# Patient Record
Sex: FEMALE | Race: BLACK OR AFRICAN AMERICAN | NOT HISPANIC OR LATINO | Employment: FULL TIME | URBAN - METROPOLITAN AREA
[De-identification: names, ages, dates, MRNs, and addresses within clinical notes are randomized per-mention and may not be internally consistent; named-entity substitution may affect disease eponyms.]

---

## 2023-02-03 PROBLEM — D72.819 LEUKOPENIA: Status: ACTIVE | Noted: 2023-02-03

## 2023-02-03 PROBLEM — H35.54 RETINAL PIGMENT EPITHELIAL HYPERTROPHY: Status: ACTIVE | Noted: 2023-02-03

## 2023-02-03 PROBLEM — D64.9 ANEMIA: Status: ACTIVE | Noted: 2023-02-03

## 2023-02-03 PROBLEM — E11.9 TYPE 2 DIABETES MELLITUS (MULTI): Status: ACTIVE | Noted: 2023-02-03

## 2023-02-03 PROBLEM — E03.9 HYPOTHYROIDISM: Status: ACTIVE | Noted: 2023-02-03

## 2023-02-03 PROBLEM — H00.019 STY, EXTERNAL: Status: ACTIVE | Noted: 2023-02-03

## 2023-02-03 PROBLEM — H52.4 PRESBYOPIA OF BOTH EYES: Status: ACTIVE | Noted: 2023-02-03

## 2023-02-03 PROBLEM — R29.818 SUSPECTED SLEEP APNEA: Status: ACTIVE | Noted: 2023-02-03

## 2023-02-03 PROBLEM — E78.9 LIPID DISORDER: Status: ACTIVE | Noted: 2023-02-03

## 2023-02-03 PROBLEM — H52.221 HYPEROPIA OF RIGHT EYE WITH REGULAR ASTIGMATISM: Status: ACTIVE | Noted: 2023-02-03

## 2023-02-03 PROBLEM — M79.10 MYALGIA: Status: ACTIVE | Noted: 2023-02-03

## 2023-02-03 PROBLEM — C02.9 TONGUE CANCER (MULTI): Status: ACTIVE | Noted: 2023-02-03

## 2023-02-03 PROBLEM — M54.2 NECK PAIN: Status: ACTIVE | Noted: 2023-02-03

## 2023-02-03 PROBLEM — Z85.810 HISTORY OF TONGUE CANCER: Status: ACTIVE | Noted: 2023-02-03

## 2023-02-03 PROBLEM — E55.9 VITAMIN D DEFICIENCY: Status: ACTIVE | Noted: 2023-02-03

## 2023-02-03 PROBLEM — D72.829 LEUKOCYTOSIS: Status: ACTIVE | Noted: 2023-02-03

## 2023-02-03 PROBLEM — F43.9 STRESS: Status: ACTIVE | Noted: 2023-02-03

## 2023-02-03 PROBLEM — E66.9 OBESITY, CLASS II, BMI 35-39.9: Status: ACTIVE | Noted: 2023-02-03

## 2023-02-03 PROBLEM — E66.812 OBESITY, CLASS II, BMI 35-39.9: Status: ACTIVE | Noted: 2023-02-03

## 2023-02-03 PROBLEM — R06.83 SNORING: Status: ACTIVE | Noted: 2023-02-03

## 2023-02-03 PROBLEM — C77.3: Status: ACTIVE | Noted: 2023-02-03

## 2023-02-03 PROBLEM — J30.9 ALLERGIC RHINITIS: Status: ACTIVE | Noted: 2023-02-03

## 2023-02-03 PROBLEM — E88.810 DYSMETABOLIC SYNDROME: Status: ACTIVE | Noted: 2023-02-03

## 2023-02-03 PROBLEM — G89.18 ACUTE POST-OPERATIVE PAIN: Status: ACTIVE | Noted: 2023-02-03

## 2023-02-03 PROBLEM — G47.30 SLEEP APNEA: Status: ACTIVE | Noted: 2023-02-03

## 2023-02-03 PROBLEM — K14.8 TONGUE LESION: Status: ACTIVE | Noted: 2023-02-03

## 2023-02-03 PROBLEM — R00.0 TACHYCARDIA: Status: ACTIVE | Noted: 2023-02-03

## 2023-02-03 PROBLEM — H52.01 HYPEROPIA OF RIGHT EYE WITH REGULAR ASTIGMATISM: Status: ACTIVE | Noted: 2023-02-03

## 2023-02-03 PROBLEM — H25.013 CORTICAL AGE-RELATED CATARACT OF BOTH EYES: Status: ACTIVE | Noted: 2023-02-03

## 2023-02-03 PROBLEM — M25.569 KNEE PAIN: Status: ACTIVE | Noted: 2023-02-03

## 2023-02-03 PROBLEM — H52.13 AXIAL MYOPIA OF BOTH EYES: Status: ACTIVE | Noted: 2023-02-03

## 2023-02-03 PROBLEM — G47.00 INSOMNIA: Status: ACTIVE | Noted: 2023-02-03

## 2023-02-03 PROBLEM — R50.9 FEVER AND CHILLS: Status: ACTIVE | Noted: 2023-02-03

## 2023-02-03 PROBLEM — M17.11 ARTHRITIS OF KNEE, RIGHT: Status: ACTIVE | Noted: 2023-02-03

## 2023-02-03 PROBLEM — R73.9 HIGH BLOOD SUGAR: Status: ACTIVE | Noted: 2023-02-03

## 2023-02-03 RX ORDER — LEVOTHYROXINE SODIUM 50 UG/1
1 TABLET ORAL DAILY
COMMUNITY
Start: 2022-05-20 | End: 2023-06-13

## 2023-02-03 RX ORDER — FLUTICASONE PROPIONATE 50 MCG
1-2 SPRAY, SUSPENSION (ML) NASAL DAILY
COMMUNITY
Start: 2019-05-09

## 2023-02-03 RX ORDER — ASPIRIN 325 MG
TABLET, DELAYED RELEASE (ENTERIC COATED) ORAL
COMMUNITY

## 2023-02-03 RX ORDER — LORATADINE 10 MG/1
1 TABLET ORAL DAILY
COMMUNITY
Start: 2019-05-09

## 2023-03-16 ENCOUNTER — OFFICE VISIT (OUTPATIENT)
Dept: PRIMARY CARE | Facility: CLINIC | Age: 58
End: 2023-03-16
Payer: COMMERCIAL

## 2023-03-16 VITALS
BODY MASS INDEX: 26.33 KG/M2 | WEIGHT: 158 LBS | HEIGHT: 65 IN | DIASTOLIC BLOOD PRESSURE: 70 MMHG | TEMPERATURE: 96.7 F | SYSTOLIC BLOOD PRESSURE: 111 MMHG | HEART RATE: 71 BPM

## 2023-03-16 DIAGNOSIS — E03.9 HYPOTHYROIDISM, UNSPECIFIED TYPE: Primary | ICD-10-CM

## 2023-03-16 DIAGNOSIS — E55.9 VITAMIN D DEFICIENCY: ICD-10-CM

## 2023-03-16 DIAGNOSIS — S60.419S: ICD-10-CM

## 2023-03-16 DIAGNOSIS — D64.9 ANEMIA, UNSPECIFIED TYPE: ICD-10-CM

## 2023-03-16 DIAGNOSIS — Z00.00 HEALTHCARE MAINTENANCE: ICD-10-CM

## 2023-03-16 DIAGNOSIS — E78.9 LIPID DISORDER: ICD-10-CM

## 2023-03-16 DIAGNOSIS — F17.210 SMOKING GREATER THAN 20 PACK YEARS: ICD-10-CM

## 2023-03-16 DIAGNOSIS — J30.1 ALLERGIC RHINITIS DUE TO POLLEN, UNSPECIFIED SEASONALITY: ICD-10-CM

## 2023-03-16 DIAGNOSIS — Z12.39 ENCOUNTER FOR SCREENING BREAST EXAMINATION: ICD-10-CM

## 2023-03-16 PROBLEM — C77.3: Status: RESOLVED | Noted: 2023-02-03 | Resolved: 2023-03-16

## 2023-03-16 PROBLEM — R06.83 SNORING: Status: RESOLVED | Noted: 2023-02-03 | Resolved: 2023-03-16

## 2023-03-16 PROBLEM — G47.00 INSOMNIA: Status: RESOLVED | Noted: 2023-02-03 | Resolved: 2023-03-16

## 2023-03-16 PROBLEM — K14.8 TONGUE LESION: Status: RESOLVED | Noted: 2023-02-03 | Resolved: 2023-03-16

## 2023-03-16 PROBLEM — G47.30 SLEEP APNEA: Status: RESOLVED | Noted: 2023-02-03 | Resolved: 2023-03-16

## 2023-03-16 PROBLEM — E11.9 TYPE 2 DIABETES MELLITUS (MULTI): Status: RESOLVED | Noted: 2023-02-03 | Resolved: 2023-03-16

## 2023-03-16 PROBLEM — G89.18 ACUTE POST-OPERATIVE PAIN: Status: RESOLVED | Noted: 2023-02-03 | Resolved: 2023-03-16

## 2023-03-16 PROBLEM — R00.0 TACHYCARDIA: Status: RESOLVED | Noted: 2023-02-03 | Resolved: 2023-03-16

## 2023-03-16 LAB
ALANINE AMINOTRANSFERASE (SGPT) (U/L) IN SER/PLAS: 8 U/L (ref 7–45)
ALBUMIN (G/DL) IN SER/PLAS: 4.2 G/DL (ref 3.4–5)
ALKALINE PHOSPHATASE (U/L) IN SER/PLAS: 53 U/L (ref 33–110)
ANION GAP IN SER/PLAS: 13 MMOL/L (ref 10–20)
ASPARTATE AMINOTRANSFERASE (SGOT) (U/L) IN SER/PLAS: 14 U/L (ref 9–39)
BASOPHILS (10*3/UL) IN BLOOD BY AUTOMATED COUNT: 0.03 X10E9/L (ref 0–0.1)
BASOPHILS/100 LEUKOCYTES IN BLOOD BY AUTOMATED COUNT: 0.8 % (ref 0–2)
BILIRUBIN TOTAL (MG/DL) IN SER/PLAS: 0.3 MG/DL (ref 0–1.2)
CALCIDIOL (25 OH VITAMIN D3) (NG/ML) IN SER/PLAS: 34 NG/ML
CALCIUM (MG/DL) IN SER/PLAS: 10.2 MG/DL (ref 8.6–10.6)
CARBON DIOXIDE, TOTAL (MMOL/L) IN SER/PLAS: 28 MMOL/L (ref 21–32)
CHLORIDE (MMOL/L) IN SER/PLAS: 106 MMOL/L (ref 98–107)
CHOLESTEROL (MG/DL) IN SER/PLAS: 232 MG/DL (ref 0–199)
CHOLESTEROL IN HDL (MG/DL) IN SER/PLAS: 74.5 MG/DL
CHOLESTEROL/HDL RATIO: 3.1
CREATININE (MG/DL) IN SER/PLAS: 0.89 MG/DL (ref 0.5–1.05)
EOSINOPHILS (10*3/UL) IN BLOOD BY AUTOMATED COUNT: 0.25 X10E9/L (ref 0–0.7)
EOSINOPHILS/100 LEUKOCYTES IN BLOOD BY AUTOMATED COUNT: 6.4 % (ref 0–6)
ERYTHROCYTE DISTRIBUTION WIDTH (RATIO) BY AUTOMATED COUNT: 13.2 % (ref 11.5–14.5)
ERYTHROCYTE MEAN CORPUSCULAR HEMOGLOBIN CONCENTRATION (G/DL) BY AUTOMATED: 32.5 G/DL (ref 32–36)
ERYTHROCYTE MEAN CORPUSCULAR VOLUME (FL) BY AUTOMATED COUNT: 91 FL (ref 80–100)
ERYTHROCYTES (10*6/UL) IN BLOOD BY AUTOMATED COUNT: 3.88 X10E12/L (ref 4–5.2)
GFR FEMALE: 75 ML/MIN/1.73M2
GLUCOSE (MG/DL) IN SER/PLAS: 83 MG/DL (ref 74–99)
HEMATOCRIT (%) IN BLOOD BY AUTOMATED COUNT: 35.4 % (ref 36–46)
HEMOGLOBIN (G/DL) IN BLOOD: 11.5 G/DL (ref 12–16)
IMMATURE GRANULOCYTES/100 LEUKOCYTES IN BLOOD BY AUTOMATED COUNT: 0 % (ref 0–0.9)
LDL: 117 MG/DL (ref 0–99)
LEUKOCYTES (10*3/UL) IN BLOOD BY AUTOMATED COUNT: 3.9 X10E9/L (ref 4.4–11.3)
LYMPHOCYTES (10*3/UL) IN BLOOD BY AUTOMATED COUNT: 1.26 X10E9/L (ref 1.2–4.8)
LYMPHOCYTES/100 LEUKOCYTES IN BLOOD BY AUTOMATED COUNT: 32.1 % (ref 13–44)
MONOCYTES (10*3/UL) IN BLOOD BY AUTOMATED COUNT: 0.35 X10E9/L (ref 0.1–1)
MONOCYTES/100 LEUKOCYTES IN BLOOD BY AUTOMATED COUNT: 8.9 % (ref 2–10)
NEUTROPHILS (10*3/UL) IN BLOOD BY AUTOMATED COUNT: 2.03 X10E9/L (ref 1.2–7.7)
NEUTROPHILS/100 LEUKOCYTES IN BLOOD BY AUTOMATED COUNT: 51.8 % (ref 40–80)
NON HDL CHOLESTEROL: 158 MG/DL
NRBC (PER 100 WBCS) BY AUTOMATED COUNT: 0 /100 WBC (ref 0–0)
PLATELETS (10*3/UL) IN BLOOD AUTOMATED COUNT: 165 X10E9/L (ref 150–450)
POTASSIUM (MMOL/L) IN SER/PLAS: 4.5 MMOL/L (ref 3.5–5.3)
PROTEIN TOTAL: 7 G/DL (ref 6.4–8.2)
SODIUM (MMOL/L) IN SER/PLAS: 142 MMOL/L (ref 136–145)
TRIGLYCERIDE (MG/DL) IN SER/PLAS: 201 MG/DL (ref 0–149)
UREA NITROGEN (MG/DL) IN SER/PLAS: 13 MG/DL (ref 6–23)
VLDL: 40 MG/DL (ref 0–40)

## 2023-03-16 PROCEDURE — 1036F TOBACCO NON-USER: CPT | Performed by: FAMILY MEDICINE

## 2023-03-16 PROCEDURE — 85025 COMPLETE CBC W/AUTO DIFF WBC: CPT

## 2023-03-16 PROCEDURE — 80061 LIPID PANEL: CPT

## 2023-03-16 PROCEDURE — 80053 COMPREHEN METABOLIC PANEL: CPT

## 2023-03-16 PROCEDURE — 99396 PREV VISIT EST AGE 40-64: CPT | Performed by: FAMILY MEDICINE

## 2023-03-16 PROCEDURE — 36415 COLL VENOUS BLD VENIPUNCTURE: CPT | Performed by: FAMILY MEDICINE

## 2023-03-16 PROCEDURE — 82306 VITAMIN D 25 HYDROXY: CPT

## 2023-03-16 NOTE — PATIENT INSTRUCTIONS
1.  Every day sleep  at night or rest ( some days we are unable to sleep )around the same time without the TV-this is important habit to reduce dementia and aging prematurely    2.  Eat 3 cups of green leafy vegetables daily include at least 1 fruit.,  Reduce animal protein consumption-/ also  Starch  consumption  --this will help to lose weight avoid illnesses such as dementia high blood pressure cancer.,  Diabetes    3.  Exercise including aerobics as well as resistant exercise for at least 45 minutes a day for 5 days this will also help to reduce premature aging

## 2023-06-13 DIAGNOSIS — E03.9 HYPOTHYROIDISM, UNSPECIFIED TYPE: Primary | ICD-10-CM

## 2023-06-13 RX ORDER — LEVOTHYROXINE SODIUM 50 UG/1
TABLET ORAL
Qty: 30 TABLET | Refills: 0 | Status: SHIPPED | OUTPATIENT
Start: 2023-06-13 | End: 2023-09-20 | Stop reason: SDUPTHER

## 2023-09-19 ENCOUNTER — APPOINTMENT (OUTPATIENT)
Dept: PRIMARY CARE | Facility: CLINIC | Age: 58
End: 2023-09-19
Payer: COMMERCIAL

## 2023-09-20 ENCOUNTER — OFFICE VISIT (OUTPATIENT)
Dept: PRIMARY CARE | Facility: CLINIC | Age: 58
End: 2023-09-20
Payer: COMMERCIAL

## 2023-09-20 VITALS
HEIGHT: 65 IN | WEIGHT: 157 LBS | BODY MASS INDEX: 26.16 KG/M2 | HEART RATE: 59 BPM | DIASTOLIC BLOOD PRESSURE: 75 MMHG | SYSTOLIC BLOOD PRESSURE: 114 MMHG | TEMPERATURE: 97.4 F

## 2023-09-20 DIAGNOSIS — E78.9 LIPID DISORDER: Primary | ICD-10-CM

## 2023-09-20 DIAGNOSIS — E03.9 HYPOTHYROIDISM, UNSPECIFIED TYPE: ICD-10-CM

## 2023-09-20 DIAGNOSIS — R73.9 HIGH BLOOD SUGAR: ICD-10-CM

## 2023-09-20 LAB
CHOLESTEROL (MG/DL) IN SER/PLAS: 232 MG/DL (ref 0–199)
CHOLESTEROL IN HDL (MG/DL) IN SER/PLAS: 75.1 MG/DL
CHOLESTEROL/HDL RATIO: 3.1
ESTIMATED AVERAGE GLUCOSE FOR HBA1C: 111 MG/DL
HEMOGLOBIN A1C/HEMOGLOBIN TOTAL IN BLOOD: 5.5 %
LDL: 141 MG/DL (ref 0–99)
THYROTROPIN (MIU/L) IN SER/PLAS BY DETECTION LIMIT <= 0.05 MIU/L: 18.6 MIU/L (ref 0.44–3.98)
THYROXINE (T4) FREE (NG/DL) IN SER/PLAS: 0.82 NG/DL (ref 0.78–1.48)
TRIGLYCERIDE (MG/DL) IN SER/PLAS: 80 MG/DL (ref 0–149)
VLDL: 16 MG/DL (ref 0–40)

## 2023-09-20 PROCEDURE — 1036F TOBACCO NON-USER: CPT | Performed by: FAMILY MEDICINE

## 2023-09-20 PROCEDURE — 83036 HEMOGLOBIN GLYCOSYLATED A1C: CPT

## 2023-09-20 PROCEDURE — 84439 ASSAY OF FREE THYROXINE: CPT

## 2023-09-20 PROCEDURE — 84443 ASSAY THYROID STIM HORMONE: CPT

## 2023-09-20 PROCEDURE — 99214 OFFICE O/P EST MOD 30 MIN: CPT | Performed by: FAMILY MEDICINE

## 2023-09-20 PROCEDURE — 80061 LIPID PANEL: CPT

## 2023-09-20 RX ORDER — LEVOTHYROXINE SODIUM 50 UG/1
50 TABLET ORAL
Qty: 30 TABLET | Refills: 0 | Status: SHIPPED | OUTPATIENT
Start: 2023-09-20 | End: 2023-09-21 | Stop reason: ENTERED-IN-ERROR

## 2023-09-20 NOTE — PROGRESS NOTES
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  She says she has had all dental extractions and is going through hyperbaric treatment organized by the ENT doctor    She is anxious to take the flu shot she promised to do it at her regular pharmacy and she is getting her second Shingrix shot    Her triglyceride was high at her last visit I educated her on exercise and good nutrition and also referred her to the nutritionist    I informed her the dangers of high triglyceride is moving on to having diabetes therefore I will check her A1c today    Health maintenance mammogram colonoscopy normal up-to-date    She has not got her CT of the lung since she has over 20-pack-year history of smoking she promised to get that done before her next visit    Her next visit will be in 6 months and that will be the annual physical

## 2023-09-21 RX ORDER — LEVOTHYROXINE SODIUM 75 UG/1
75 TABLET ORAL DAILY
Qty: 30 TABLET | Refills: 11 | Status: SHIPPED | OUTPATIENT
Start: 2023-09-21 | End: 2024-01-05 | Stop reason: DRUGHIGH

## 2023-10-12 ENCOUNTER — HOSPITAL ENCOUNTER (EMERGENCY)
Facility: HOSPITAL | Age: 58
Discharge: HOME | End: 2023-10-12
Payer: COMMERCIAL

## 2023-10-12 ENCOUNTER — APPOINTMENT (OUTPATIENT)
Dept: RADIOLOGY | Facility: HOSPITAL | Age: 58
End: 2023-10-12
Payer: COMMERCIAL

## 2023-10-12 VITALS
HEIGHT: 65 IN | OXYGEN SATURATION: 100 % | BODY MASS INDEX: 26.16 KG/M2 | DIASTOLIC BLOOD PRESSURE: 83 MMHG | HEART RATE: 63 BPM | WEIGHT: 157 LBS | SYSTOLIC BLOOD PRESSURE: 121 MMHG | RESPIRATION RATE: 18 BRPM

## 2023-10-12 DIAGNOSIS — M25.511 ACUTE PAIN OF RIGHT SHOULDER: ICD-10-CM

## 2023-10-12 DIAGNOSIS — M25.561 ACUTE PAIN OF BOTH KNEES: Primary | ICD-10-CM

## 2023-10-12 DIAGNOSIS — M25.562 ACUTE PAIN OF BOTH KNEES: Primary | ICD-10-CM

## 2023-10-12 PROCEDURE — 72050 X-RAY EXAM NECK SPINE 4/5VWS: CPT | Performed by: RADIOLOGY

## 2023-10-12 PROCEDURE — 73564 X-RAY EXAM KNEE 4 OR MORE: CPT | Mod: BILATERAL PROCEDURE | Performed by: RADIOLOGY

## 2023-10-12 PROCEDURE — 73030 X-RAY EXAM OF SHOULDER: CPT | Mod: RIGHT SIDE | Performed by: RADIOLOGY

## 2023-10-12 PROCEDURE — 72050 X-RAY EXAM NECK SPINE 4/5VWS: CPT

## 2023-10-12 PROCEDURE — 73564 X-RAY EXAM KNEE 4 OR MORE: CPT | Mod: 50

## 2023-10-12 PROCEDURE — 99284 EMERGENCY DEPT VISIT MOD MDM: CPT | Mod: 25

## 2023-10-12 PROCEDURE — 73030 X-RAY EXAM OF SHOULDER: CPT | Mod: RT

## 2023-10-12 RX ORDER — CYCLOBENZAPRINE HCL 10 MG
10 TABLET ORAL 2 TIMES DAILY PRN
Qty: 20 TABLET | Refills: 0 | Status: SHIPPED | OUTPATIENT
Start: 2023-10-12 | End: 2023-10-22

## 2023-10-12 ASSESSMENT — PAIN - FUNCTIONAL ASSESSMENT: PAIN_FUNCTIONAL_ASSESSMENT: 0-10

## 2023-10-12 ASSESSMENT — COLUMBIA-SUICIDE SEVERITY RATING SCALE - C-SSRS
2. HAVE YOU ACTUALLY HAD ANY THOUGHTS OF KILLING YOURSELF?: NO
1. IN THE PAST MONTH, HAVE YOU WISHED YOU WERE DEAD OR WISHED YOU COULD GO TO SLEEP AND NOT WAKE UP?: NO
6. HAVE YOU EVER DONE ANYTHING, STARTED TO DO ANYTHING, OR PREPARED TO DO ANYTHING TO END YOUR LIFE?: NO

## 2023-10-12 ASSESSMENT — PAIN SCALES - GENERAL: PAINLEVEL_OUTOF10: 8

## 2023-10-12 NOTE — ED PROVIDER NOTES
EMERGENCY MEDICINE EVALUATION NOTE    History of Present Illness     Chief Complaint:   Chief Complaint   Patient presents with    Arm Injury    Knee Injury     Pt had mechanical fall and co right arm and knee pain. Pt denies hitting head or blood thinners.        HPI: Mandi Maharaj is a 58 y.o. female presents with a chief complaint of injury at work.  Patient reports that she had an injury that occurred today while she was at work.  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  She states that in the process of trying to catch him she had a mechanical fall.  She reports that she fell forward on both knees as well as onto the right arm.  She states that she is having pain in both knees worse on the right though.  She also reports that she is having pain in her right shoulder into her right neck.  She was he has previous rotator cuff surgery on the right shoulder but denies any other injuries or surgeries to the right shoulder.  Patient states that she went through her day at school but then the pain started to become worse so she came in to be evaluated.  Patient states that she feels stiff in the upper part of her body due to the pain from her neck to her shoulder.  She states he also has pain in the knees with ambulation but she is able to ambulate with no difficulty.  Patient denies taking thing at home for her symptoms.  Patient states she did not hit her head and did not lose consciousness.    Previous History     Past Medical History:   Diagnosis Date    Encounter for general adult medical examination without abnormal findings 10/03/2019    Physical exam, annual    Encounter for general adult medical examination without abnormal findings 12/09/2020    Physical exam, annual    Personal history of other specified conditions 12/17/2020    History of tachycardia    Type 2 diabetes mellitus without complications (CMS/[facility]) 12/21/2020     Controlled diabetes mellitus     Past Surgical History:   Procedure Laterality Date    HYSTERECTOMY  07/31/2018    Hysterectomy    US GUIDED BIOPSY LYMPH NODE SUPERFICIAL  9/21/2020    US GUIDED BIOPSY LYMPH NODE SUPERFICIAL 9/21/2020 [facility]     Social History     Tobacco Use    Smoking status: Former     Types: Cigarettes    Smokeless tobacco: Never   Substance Use Topics    Alcohol use: Never     Family History   Problem Relation Name Age of Onset    Endometriosis Sister      Other (CARDIAC DISORDER) Other UNCLE     Endometriosis Other UNCLE     Diabetes Other      Kidney failure Other       No Known Allergies  Current Outpatient Medications   Medication Instructions    ascorbic acid (VITAMIN C ORAL) oral    cholecalciferol (Vitamin D-3) 1,250 mcg (50,000 unit) capsule oral    cyclobenzaprine (FLEXERIL) 10 mg, oral, 2 times daily PRN    fluticasone (Flonase) 50 mcg/actuation nasal spray 1-2 sprays, nasal, Daily    levothyroxine (SYNTHROID, LEVOXYL) 75 mcg, oral, Daily    loratadine (Claritin) 10 mg tablet 1 tablet, oral, Daily       Physical Exam     Appearance: Alert, oriented , cooperative     Skin: Intact,  dry skin, no lesions, rash, petechiae or purpura.      Eyes: PERRLA, EOMs intact,  Conjunctiva pink      ENT: Hearing grossly intact. Pharynx clear     Neck: Supple. Trachea at midline.      Pulmonary: Clear bilaterally. No rales, rhonchi or wheezing. No accessory muscle use or stridor.     Cardiac: Normal rate and rhythm without murmur     Abdomen: Soft, nontender, active bowel sounds.     Musculoskeletal: Full range of motion.  Full range of motion of all extremities.  No midline C, T, L-spine tenderness.  Patient does have tenderness in the right-sided cervical paraspinal muscles to the right shoulder.  Patient neurovascular tact in all 4 extremities.  Patient also has diffuse tenderness across the anterior knee joints bilaterally.     Neurological:Cranial nerves II through XII are grossly intact,  "normal sensation, no weakness, no focal findings identified.     Results   Labs Reviewed - No data to display  XR shoulder right 2+ views   Final Result   Chronic and degenerative changes of the shoulder without acute   osseous abnormality detected.             MACRO:   None        Signed by: Eddie Chaparro 10/12/2023 5:52 PM   Dictation workstation:   [id number]      XR cervical spine complete 4-5 views   Final Result   Multilevel spondylosis without acute osseous abnormality of the   cervical spine.             MACRO:   None        Signed by: Eddie Chaparro 10/12/2023 5:49 PM   Dictation workstation:   [id number]      XR knee 4+ views bilateral   Final Result   Degenerative changes of the bilateral knees without acute osseous   abnormality detected.             MACRO:   None        Signed by: Eddie Chaparro 10/12/2023 5:47 PM   Dictation workstation:   [id number]            ED Course & Medical Decision Making   Medications - No data to display  Heart Rate:  [63]   Resp:  [18]   BP: (121)/(83)   Height:  [165.1 cm (5' 5\")]   Weight:  [71.2 kg (157 lb)]   SpO2:  [100 %]    Diagnoses as of 10/12/23 1839   Acute pain of both knees   Acute pain of right shoulder     Mandi Maharaj is a 58 y.o. female presents with a chief complaint of injury at work.  Patient reports that she had an injury that occurred today while she was at work.  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  She states that in the process of trying to catch him she had a mechanical fall.  She reports that she fell forward on both knees as well as onto the right arm.  She states that she is having pain in both knees worse on the right though.  She also reports that she is having pain in her right shoulder into her right neck.  She was he has previous rotator cuff surgery on the right shoulder but denies any other injuries or surgeries to the right shoulder.  Patient states " that she went through her day at school but then the pain started to become worse so she came in to be evaluated.  Once again patient did not hit her head so do not think that any CT imaging of the head and neck was necessary.  Patient had work-up today which included x-rays of the cervical spine, right shoulder, as well as bilateral knees.  Patient's x-rays do not show any acute abnormalities.  General degenerative changes of cervical spine.  Patient be discharged home at this time to follow-up with primary care provider.  She will be provided with Flexeril at home for her pain.  She is agreement with the plan of care.  She was encouraged to return to the ED with any worsening symptoms.    Procedures   Procedures    Diagnosis     1. Acute pain of both knees    2. Acute pain of right shoulder        Disposition   Discharged     ED Prescriptions       Medication Sig Dispense Start Date End Date Auth. Provider    cyclobenzaprine (Flexeril) 10 mg tablet Take 1 tablet (10 mg) by mouth 2 times a day as needed for muscle spasms for up to 10 days. 20 tablet 10/12/2023 10/22/2023 SOY Richardson PA-C  10/12/23 9879

## 2023-10-16 ENCOUNTER — OFFICE VISIT (OUTPATIENT)
Dept: PRIMARY CARE | Facility: CLINIC | Age: 58
End: 2023-10-16
Payer: COMMERCIAL

## 2023-10-16 VITALS
BODY MASS INDEX: 25.47 KG/M2 | HEART RATE: 70 BPM | WEIGHT: 152.9 LBS | HEIGHT: 65 IN | DIASTOLIC BLOOD PRESSURE: 87 MMHG | SYSTOLIC BLOOD PRESSURE: 141 MMHG

## 2023-10-16 DIAGNOSIS — M25.561 ACUTE PAIN OF RIGHT KNEE: ICD-10-CM

## 2023-10-16 DIAGNOSIS — M25.511 ACUTE PAIN OF RIGHT SHOULDER: Primary | ICD-10-CM

## 2023-10-16 DIAGNOSIS — M54.10 RADICULOPATHY, UNSPECIFIED SPINAL REGION: ICD-10-CM

## 2023-10-16 PROCEDURE — 99214 OFFICE O/P EST MOD 30 MIN: CPT | Performed by: FAMILY MEDICINE

## 2023-10-16 PROCEDURE — 1036F TOBACCO NON-USER: CPT | Performed by: FAMILY MEDICINE

## 2023-10-16 RX ORDER — MELOXICAM 15 MG/1
15 TABLET ORAL DAILY
Qty: 30 TABLET | Refills: 11 | Status: SHIPPED | OUTPATIENT
Start: 2023-10-16 | End: 2024-10-15

## 2023-10-16 RX ORDER — PREDNISONE 20 MG/1
20 TABLET ORAL 2 TIMES DAILY
Qty: 10 TABLET | Refills: 0 | Status: SHIPPED | OUTPATIENT
Start: 2023-10-16 | End: 2023-10-21

## 2023-10-16 NOTE — PROGRESS NOTES
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 and in that process she fell on both knees and also extended right    She was seen in the emergency room on the 12th and x-rays were negative and she was given a muscle relaxant    Today's exam she is in pain unable to abduct or internal rotation (she has had surgery for rotator cuff surgery of the same shoulder some years ago) since she is complaining of pain starting on the right side of the neck radiating down to the fingertips with tingling my diagnosis is cervical radiculopathy aggravated by the fall    Also the right knee she has effusion and it is tender to touch no sign of inflammation other than the fluid collection and the tenderness no warmth or redness    I will start her on prednisone 20 mg 1 p.o. twice daily for 5 days along with meloxicam    Referred her to physical therapy for cervical radiculopathy and shoulder pain    I also placed a referral to see the orthopedic doctor for the right knee pain    I advised her to come back in 2 weeks

## 2023-10-16 NOTE — PATIENT INSTRUCTIONS
Because of the injury you are experiencing pain from the neck down to your fingertips along with tingling.  This is due to the shock of the fall it is like a whiplash injury for that reason I am prescribing prednisone 20 mg 1 tablet twice a day for 5 days to get rid of the inflammation    Also I am prescribing meloxicam 15 mg to be taken you can take both at the same time with food    Right shoulder pain can lead you to have frozen shoulder to avoid that I am sending you for physical therapy      For the right knee pain that also you acquired after the fall there is collection of fluid but with the prednisone I am starting you on a high dose and the meloxicam it will resolve but if the pain continues you need to see the knee orthopedic doctor and I have placed the referral    I will see you back in 2 weeks

## 2023-10-30 ENCOUNTER — APPOINTMENT (OUTPATIENT)
Dept: PRIMARY CARE | Facility: CLINIC | Age: 58
End: 2023-10-30
Payer: COMMERCIAL

## 2023-11-20 ENCOUNTER — OFFICE VISIT (OUTPATIENT)
Dept: ORTHOPEDIC SURGERY | Facility: HOSPITAL | Age: 58
End: 2023-11-20
Payer: COMMERCIAL

## 2023-11-20 DIAGNOSIS — M25.561 ACUTE PAIN OF RIGHT KNEE: ICD-10-CM

## 2023-11-20 PROCEDURE — 1036F TOBACCO NON-USER: CPT | Performed by: ORTHOPAEDIC SURGERY

## 2023-11-20 PROCEDURE — 99214 OFFICE O/P EST MOD 30 MIN: CPT | Performed by: ORTHOPAEDIC SURGERY

## 2023-11-20 PROCEDURE — 99204 OFFICE O/P NEW MOD 45 MIN: CPT | Performed by: ORTHOPAEDIC SURGERY

## 2023-11-20 RX ORDER — MELOXICAM 15 MG/1
15 TABLET ORAL DAILY
Qty: 14 TABLET | Refills: 0 | Status: SHIPPED | OUTPATIENT
Start: 2023-11-20 | End: 2023-12-04

## 2023-11-20 NOTE — PROGRESS NOTES
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  She has been evaluated and x-rays were obtained on the 12th.  They are available for review.  She was given a brace and has been doing physical therapy and she feels it is helping.  She has a prior history of arthritis of both knees.    The patient is pleasant and cooperative.  The patient is alert and oriented ×3.  Auditory function is intact.  The patient is a good historian.  The patient is not in acute distress.  Eye exam significant for nonicteric sclera, intact ocular muscle movement.  Breathing is rhythmic symmetric and nonlabored.  Patient has slight limp she has an effusion in the right knee she has tenderness to the medial joint line and lateral joint line tenderness grade 1 retropatellar crepitus range of motion is 0 to 90 degrees.  No ligament pathologic laxity.  Calf is soft and nontender.    X-rays show advanced degenerative arthritis in the medial compartment lateral compartment relatively spared patellofemoral compartment also involved with articular degenerative changes    Exacerbation of arthritis right knee    We discussed options for treatment I recommended a course of anti-inflammatory medicine continued therapy continue bracing.  Follow-up by telephone in 2 weeks.  If not back to baseline consider injection.    This was dictated using voice recognition software and not corrected for grammatical or spelling errors.

## 2023-11-24 ENCOUNTER — APPOINTMENT (OUTPATIENT)
Dept: OTOLARYNGOLOGY | Facility: HOSPITAL | Age: 58
End: 2023-11-24
Payer: COMMERCIAL

## 2023-12-06 ENCOUNTER — TELEPHONE (OUTPATIENT)
Dept: RADIATION ONCOLOGY | Facility: HOSPITAL | Age: 58
End: 2023-12-06
Payer: COMMERCIAL

## 2023-12-12 ENCOUNTER — OFFICE VISIT (OUTPATIENT)
Dept: PRIMARY CARE | Facility: CLINIC | Age: 58
End: 2023-12-12
Payer: COMMERCIAL

## 2023-12-12 VITALS
BODY MASS INDEX: 26.69 KG/M2 | TEMPERATURE: 97.4 F | SYSTOLIC BLOOD PRESSURE: 112 MMHG | DIASTOLIC BLOOD PRESSURE: 71 MMHG | HEART RATE: 68 BPM | WEIGHT: 160.4 LBS

## 2023-12-12 DIAGNOSIS — E03.9 HYPOTHYROIDISM, UNSPECIFIED TYPE: ICD-10-CM

## 2023-12-12 DIAGNOSIS — Z79.4 TYPE 2 DIABETES MELLITUS WITHOUT COMPLICATION, WITH LONG-TERM CURRENT USE OF INSULIN (MULTI): ICD-10-CM

## 2023-12-12 DIAGNOSIS — E11.9 TYPE 2 DIABETES MELLITUS WITHOUT COMPLICATION, WITH LONG-TERM CURRENT USE OF INSULIN (MULTI): ICD-10-CM

## 2023-12-12 DIAGNOSIS — F43.9 STRESS: Primary | ICD-10-CM

## 2023-12-12 DIAGNOSIS — C02.9 TONGUE CANCER (MULTI): ICD-10-CM

## 2023-12-12 LAB — HBA1C MFR BLD: 5.4 % (ref 4.2–6.5)

## 2023-12-12 PROCEDURE — 83036 HEMOGLOBIN GLYCOSYLATED A1C: CPT | Mod: CLIA WAIVED TEST | Performed by: FAMILY MEDICINE

## 2023-12-12 PROCEDURE — 99214 OFFICE O/P EST MOD 30 MIN: CPT | Performed by: FAMILY MEDICINE

## 2023-12-12 PROCEDURE — 1036F TOBACCO NON-USER: CPT | Performed by: FAMILY MEDICINE

## 2023-12-12 PROCEDURE — 3074F SYST BP LT 130 MM HG: CPT | Performed by: FAMILY MEDICINE

## 2023-12-12 PROCEDURE — 3078F DIAST BP <80 MM HG: CPT | Performed by: FAMILY MEDICINE

## 2023-12-12 PROCEDURE — 3044F HG A1C LEVEL LT 7.0%: CPT | Performed by: FAMILY MEDICINE

## 2023-12-12 RX ORDER — DULOXETIN HYDROCHLORIDE 30 MG/1
CAPSULE, DELAYED RELEASE ORAL
Qty: 60 CAPSULE | Refills: 11 | Status: SHIPPED | OUTPATIENT
Start: 2023-12-12

## 2023-12-12 NOTE — PROGRESS NOTES
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

## 2023-12-12 NOTE — PATIENT INSTRUCTIONS
I have started you on a medicine called duloxetine that will help with stress as well as the pain--for the first week start taking only 1 capsule a day at night the side effect you might experience is nausea that is why I am telling you to take 1 capsule only for the first week and then second week please increase it to 2 capsules once that comes into effect you will see the pain is becoming less you can cut back on the meloxicam you can take it like every other day    I will also do her A1c regarding your diabetes and if it is normal I am going to take the diagnosis away from your chart    I like you to come back in 2 months

## 2023-12-14 ENCOUNTER — HOSPITAL ENCOUNTER (OUTPATIENT)
Dept: RADIATION ONCOLOGY | Facility: HOSPITAL | Age: 58
Setting detail: RADIATION/ONCOLOGY SERIES
Discharge: HOME | End: 2023-12-14
Payer: COMMERCIAL

## 2023-12-14 ENCOUNTER — TELEPHONE (OUTPATIENT)
Dept: RESEARCH | Age: 58
End: 2023-12-14

## 2023-12-14 VITALS
BODY MASS INDEX: 27.29 KG/M2 | TEMPERATURE: 97 F | DIASTOLIC BLOOD PRESSURE: 71 MMHG | SYSTOLIC BLOOD PRESSURE: 120 MMHG | HEART RATE: 76 BPM | HEIGHT: 65 IN | OXYGEN SATURATION: 99 % | RESPIRATION RATE: 18 BRPM | WEIGHT: 163.8 LBS

## 2023-12-14 DIAGNOSIS — C02.9 TONGUE CANCER (MULTI): Primary | ICD-10-CM

## 2023-12-14 DIAGNOSIS — Z92.3 HX OF RADIATION THERAPY: ICD-10-CM

## 2023-12-14 PROCEDURE — 99214 OFFICE O/P EST MOD 30 MIN: CPT

## 2023-12-14 ASSESSMENT — ENCOUNTER SYMPTOMS
CONFUSION: 0
EXTREMITY WEAKNESS: 0
ADENOPATHY: 0
EYE PROBLEMS: 1
DIARRHEA: 0
SEIZURES: 0
NAUSEA: 0
ABDOMINAL DISTENTION: 0
DIFFICULTY URINATING: 0
UNEXPECTED WEIGHT CHANGE: 0
VOMITING: 0
NERVOUS/ANXIOUS: 0
CHEST TIGHTNESS: 0
BLOOD IN STOOL: 0
LEG SWELLING: 0
OCCASIONAL FEELINGS OF UNSTEADINESS: 0
COUGH: 0
HEADACHES: 0
SPEECH DIFFICULTY: 0
FATIGUE: 0
CHILLS: 0
ARTHRALGIAS: 1
LIGHT-HEADEDNESS: 0
ABDOMINAL PAIN: 0
LOSS OF SENSATION IN FEET: 0
DEPRESSION: 0
CONSTIPATION: 0
SORE THROAT: 0
WHEEZING: 0
TROUBLE SWALLOWING: 0
HEMATURIA: 0
FEVER: 0
SHORTNESS OF BREATH: 0
NUMBNESS: 0
DIZZINESS: 0

## 2023-12-14 ASSESSMENT — PATIENT HEALTH QUESTIONNAIRE - PHQ9
2. FEELING DOWN, DEPRESSED OR HOPELESS: NOT AT ALL
1. LITTLE INTEREST OR PLEASURE IN DOING THINGS: NOT AT ALL
SUM OF ALL RESPONSES TO PHQ9 QUESTIONS 1 AND 2: 0

## 2023-12-14 ASSESSMENT — PAIN SCALES - GENERAL: PAINLEVEL: 0-NO PAIN

## 2023-12-14 ASSESSMENT — COLUMBIA-SUICIDE SEVERITY RATING SCALE - C-SSRS
6. HAVE YOU EVER DONE ANYTHING, STARTED TO DO ANYTHING, OR PREPARED TO DO ANYTHING TO END YOUR LIFE?: NO
1. IN THE PAST MONTH, HAVE YOU WISHED YOU WERE DEAD OR WISHED YOU COULD GO TO SLEEP AND NOT WAKE UP?: NO
2. HAVE YOU ACTUALLY HAD ANY THOUGHTS OF KILLING YOURSELF?: NO

## 2023-12-14 NOTE — PROGRESS NOTES
Radiation Oncology Follow-Up    Patient Name:  Mandi Maharaj  MRN:  [id number]  :  1965    Referring Provider: No ref. provider found  Primary Care Provider: Denise Saldana MD  Care Team: Patient Care Team:  Denise Saldana MD as PCP - General    Date of Service: 2023     Treatment Synopsis:    58 year old female with stage [id number] oral tongue cancer, status post initial surgical resection and flap reconstruction and left neck dissection on 2020 and  then a right neck dissection on 10/1/2020. There are extracapsular extension. She was enrolled in RTOG 1216 and was randomized to cisplatin radiation plus Atezolizumab. She received radiation on 10/26/2020 to 2020 over 42 elapsed days. She received  a total dose of 66 Gy over 30 fractions.      The patient received adjuvant immunotherapy. The patient had a concerning lesion of the left lateral tongue that was biopsied shortly after her RT course and was concerning for residual SCC. Upon further review following repeat biopsy this was felt to  most likely be a treatment related ulcer.    SUBJECTIVE  History of Present Illness:   Mandi is here today for routine radiation follow up.  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  Endorses recently spraining her knee, on 10/12/23, which occurred when a student inadvertently pulled her to the ground.  States that she's been in PT which has helped a bit.  Endorses being able to take PO nutrition without difficulty.  She continues to chop the denser food items (e.g. chicken) that she eats since she is mostly edentulous.  Denies coughing or choking when she eating.  Denies odynophagia, dysphagia, mucositis, or new lumps/bumps/discolorations.  She does have some mild trismus.  Xerostomia has improved a bit.  She continue to use Biotene tablets about twice weekly.  Taste continues to improve as well. Had recent dental extractions  with pre/post HBO.  Endorses that her oral cavity has healed.  Will be seeing her dentist in the next month or so for dentures.  She states that she's been putting this off because of some health issue with her parents.  Continues to use ACT mouthwash for fluoride management.  Continues to see her PCP for thyroid dysfunction.  Her TSH on 9/20/23 was 18.60.   Started on 75mg of levothyroxine on 9/21/23.  Endorses some dryness/discoloration of her anterior neck.  Self treats with lyssa butter and vitamin E.   Denies chills, fever, fatigue, dyspnea or chest pain.  Denies headaches, hearing changes or focal sensor/motor changes.  Endorses some age related vision change.  Denies abdominal pain, nausea, vomiting, diarrhea or constipation.  Denies any bony pain.              Treatment Rendered:   Radiation Treatments       No radiation treatments to show. (Treatments may have been administered in another system.)            Review of Systems:   Review of Systems   Constitutional:  Negative for chills, fatigue, fever and unexpected weight change.   HENT:   Negative for hearing loss, mouth sores, sore throat, tinnitus and trouble swallowing.    Eyes:  Positive for eye problems.   Respiratory:  Negative for chest tightness, cough, shortness of breath and wheezing.    Cardiovascular:  Negative for chest pain and leg swelling.   Gastrointestinal:  Negative for abdominal distention, abdominal pain, blood in stool, constipation, diarrhea, nausea and vomiting.   Genitourinary:  Negative for difficulty urinating and hematuria.    Musculoskeletal:  Positive for arthralgias (right knee pain.).   Neurological:  Negative for dizziness, extremity weakness, headaches, light-headedness, numbness, seizures and speech difficulty.   Hematological:  Negative for adenopathy.   Psychiatric/Behavioral:  Negative for confusion and depression. The patient is not nervous/anxious.      The patient's current pain level was assessed.  They report  "currently having a pain of 2 out of 10.  They feel their pain is under control with the use of pain medications.    Performance Status:   The Karnofsky performance scale today is 100, Fully active, able to carry on all pre-disease performed without restriction (ECOG equivalent 0).        OBJECTIVE  Vital Signs:  /71   Pulse 76   Temp 36.1 °C (97 °F) (Skin)   Resp 18   Ht 1.651 m (5' 5\")   Wt 74.3 kg (163 lb 12.8 oz)   SpO2 99%   BMI 27.26 kg/m²    Physical Exam:  Physical Exam  Vitals and nursing note reviewed.   Constitutional:       General: She is not in acute distress.     Appearance: Normal appearance. She is normal weight. She is not ill-appearing.   HENT:      Head: Normocephalic and atraumatic. No masses.      Jaw: Trismus present. No swelling or pain on movement.      Salivary Glands: Right salivary gland is not diffusely enlarged. Left salivary gland is not diffusely enlarged.      Comments: Mild trismus.      Nose: Nose normal. No congestion or rhinorrhea.      Mouth/Throat:      Mouth: Mucous membranes are dry. No oral lesions.      Dentition: Abnormal dentition. Does not have dentures. No dental tenderness, gingival swelling or dental abscesses.      Tongue: No lesions.      Palate: No mass and lesions.      Pharynx: No pharyngeal swelling or oropharyngeal exudate.      Comments: Edentulous except for 2 front teeth on the lower jaw.   Eyes:      General: No scleral icterus.     Extraocular Movements: Extraocular movements intact.      Pupils: Pupils are equal, round, and reactive to light.   Cardiovascular:      Rate and Rhythm: Normal rate and regular rhythm.      Pulses: Normal pulses.      Heart sounds: Normal heart sounds. No murmur heard.  Pulmonary:      Effort: Pulmonary effort is normal. No respiratory distress.      Breath sounds: Normal breath sounds. No stridor. No wheezing or rhonchi.   Chest:      Chest wall: No tenderness.   Abdominal:      General: Abdomen is flat. Bowel " sounds are normal.      Palpations: Abdomen is soft. There is no mass.      Tenderness: There is no abdominal tenderness. There is no guarding or rebound.   Musculoskeletal:         General: No swelling or tenderness. Normal range of motion.      Cervical back: Normal range of motion and neck supple. No tenderness.      Right lower leg: No edema.      Left lower leg: No edema.   Skin:     General: Skin is warm and dry.      Coloration: Skin is not jaundiced.      Findings: No erythema or lesion.   Neurological:      General: No focal deficit present.      Mental Status: She is alert and oriented to person, place, and time.      Motor: No weakness.      Coordination: Coordination normal.      Gait: Gait normal.      Deep Tendon Reflexes: Reflexes normal.   Psychiatric:         Mood and Affect: Mood normal.         Behavior: Behavior normal.         Thought Content: Thought content normal.         Judgment: Judgment normal.         ASSESSMENT:  58 year old female with stage [id number] oral tongue cancer, status post initial surgical resection and flap reconstruction and left neck dissection on 8/27/2020 and  then a right neck dissection on 10/1/2020. There are extracapsular extension. She was enrolled in RTOG 1216 and was randomized to cisplatin radiation plus Atezolizumab. She received radiation on 10/26/2020 to 12/7/2020 over 42 elapsed days. She received  a total dose of 66 Gy over 30 fractions.  The patient continues to do very well in long-term FU.  All questions/concerns were addressed to the satisfaction of the patient.    PLAN:      --Routine radiation follow up in 6 mo.    --Continue to follow with Dr. Elliott for ENT management.  Next visit is 1/5/24.   --Continue to follow with Dr. Burkitt and Janel Ojeda PA-C for Medical Oncology management.   --Carotid ultrasound at next visit.   --Patient continue to use lyssa butter and vitamin E on neck.  --Patient continue to use biotene PRN for Xerostomia.   --Continue to  follow with your dentist at least 2-3 times/year.   --Continue to see your PCP at least twice a year.      Please contact us with any questions or concerns.     NCCN Guidelines were applicable to guide this patients treatment plan.  Gabriel Hickman, MARTY-CNP

## 2023-12-14 NOTE — RESEARCH NOTES
I saw the patient today at her visit with Fidel Hickman in radiation oncology. Today was the 36 month follow up visit for [id number]. I reviewed concomitant medications and adverse events with her. She is aware of her next appointment scheduled for 6/19/24.

## 2024-01-05 ENCOUNTER — LAB (OUTPATIENT)
Dept: LAB | Facility: HOSPITAL | Age: 59
End: 2024-01-05
Payer: COMMERCIAL

## 2024-01-05 ENCOUNTER — OFFICE VISIT (OUTPATIENT)
Dept: OTOLARYNGOLOGY | Facility: HOSPITAL | Age: 59
End: 2024-01-05
Payer: COMMERCIAL

## 2024-01-05 VITALS — WEIGHT: 161.6 LBS | HEIGHT: 65 IN | TEMPERATURE: 96.1 F | BODY MASS INDEX: 26.92 KG/M2

## 2024-01-05 DIAGNOSIS — E03.9 HYPOTHYROIDISM, UNSPECIFIED TYPE: ICD-10-CM

## 2024-01-05 DIAGNOSIS — E55.9 VITAMIN D DEFICIENCY: Primary | ICD-10-CM

## 2024-01-05 DIAGNOSIS — E03.9 HYPOTHYROIDISM, UNSPECIFIED TYPE: Primary | ICD-10-CM

## 2024-01-05 LAB
ALBUMIN SERPL BCP-MCNC: 3.8 G/DL (ref 3.4–5)
ALP SERPL-CCNC: 50 U/L (ref 33–110)
ALT SERPL W P-5'-P-CCNC: 8 U/L (ref 7–45)
ANION GAP SERPL CALC-SCNC: 11 MMOL/L (ref 10–20)
AST SERPL W P-5'-P-CCNC: 13 U/L (ref 9–39)
BASOPHILS # BLD AUTO: 0.03 X10*3/UL (ref 0–0.1)
BASOPHILS NFR BLD AUTO: 0.8 %
BILIRUB SERPL-MCNC: 0.3 MG/DL (ref 0–1.2)
BUN SERPL-MCNC: 13 MG/DL (ref 6–23)
CALCIUM SERPL-MCNC: 9.3 MG/DL (ref 8.6–10.3)
CHLORIDE SERPL-SCNC: 107 MMOL/L (ref 98–107)
CO2 SERPL-SCNC: 27 MMOL/L (ref 21–32)
CREAT SERPL-MCNC: 0.67 MG/DL (ref 0.5–1.05)
EOSINOPHIL # BLD AUTO: 0.21 X10*3/UL (ref 0–0.7)
EOSINOPHIL NFR BLD AUTO: 5.6 %
ERYTHROCYTE [DISTWIDTH] IN BLOOD BY AUTOMATED COUNT: 13.4 % (ref 11.5–14.5)
GFR SERPL CREATININE-BSD FRML MDRD: >90 ML/MIN/1.73M*2
GLUCOSE SERPL-MCNC: 77 MG/DL (ref 74–99)
HCT VFR BLD AUTO: 33.5 % (ref 36–46)
HGB BLD-MCNC: 11.1 G/DL (ref 12–16)
IMM GRANULOCYTES # BLD AUTO: 0.01 X10*3/UL (ref 0–0.7)
IMM GRANULOCYTES NFR BLD AUTO: 0.3 % (ref 0–0.9)
LYMPHOCYTES # BLD AUTO: 1.04 X10*3/UL (ref 1.2–4.8)
LYMPHOCYTES NFR BLD AUTO: 28 %
MCH RBC QN AUTO: 29.8 PG (ref 26–34)
MCHC RBC AUTO-ENTMCNC: 33.1 G/DL (ref 32–36)
MCV RBC AUTO: 90 FL (ref 80–100)
MONOCYTES # BLD AUTO: 0.34 X10*3/UL (ref 0.1–1)
MONOCYTES NFR BLD AUTO: 9.1 %
NEUTROPHILS # BLD AUTO: 2.09 X10*3/UL (ref 1.2–7.7)
NEUTROPHILS NFR BLD AUTO: 56.2 %
NRBC BLD-RTO: 0 /100 WBCS (ref 0–0)
PLATELET # BLD AUTO: 178 X10*3/UL (ref 150–450)
POTASSIUM SERPL-SCNC: 3.6 MMOL/L (ref 3.5–5.3)
PROT SERPL-MCNC: 6.5 G/DL (ref 6.4–8.2)
RBC # BLD AUTO: 3.72 X10*6/UL (ref 4–5.2)
SODIUM SERPL-SCNC: 141 MMOL/L (ref 136–145)
T4 FREE SERPL-MCNC: 1.26 NG/DL (ref 0.78–1.48)
TSH SERPL-ACNC: 5.46 MIU/L (ref 0.44–3.98)
WBC # BLD AUTO: 3.7 X10*3/UL (ref 4.4–11.3)

## 2024-01-05 PROCEDURE — 85025 COMPLETE CBC W/AUTO DIFF WBC: CPT

## 2024-01-05 PROCEDURE — 36415 COLL VENOUS BLD VENIPUNCTURE: CPT | Performed by: FAMILY MEDICINE

## 2024-01-05 PROCEDURE — 80053 COMPREHEN METABOLIC PANEL: CPT

## 2024-01-05 PROCEDURE — 99213 OFFICE O/P EST LOW 20 MIN: CPT | Performed by: OTOLARYNGOLOGY

## 2024-01-05 PROCEDURE — 84443 ASSAY THYROID STIM HORMONE: CPT | Performed by: FAMILY MEDICINE

## 2024-01-05 PROCEDURE — 84439 ASSAY OF FREE THYROXINE: CPT | Performed by: FAMILY MEDICINE

## 2024-01-05 PROCEDURE — 1036F TOBACCO NON-USER: CPT | Performed by: OTOLARYNGOLOGY

## 2024-01-05 RX ORDER — LEVOTHYROXINE SODIUM 100 UG/1
100 TABLET ORAL
Qty: 90 TABLET | Refills: 1 | Status: SHIPPED | OUTPATIENT
Start: 2024-01-05

## 2024-01-05 ASSESSMENT — PATIENT HEALTH QUESTIONNAIRE - PHQ9
SUM OF ALL RESPONSES TO PHQ9 QUESTIONS 1 & 2: 0
2. FEELING DOWN, DEPRESSED OR HOPELESS: NOT AT ALL
1. LITTLE INTEREST OR PLEASURE IN DOING THINGS: NOT AT ALL

## 2024-01-05 NOTE — PROGRESS NOTES
ENT Follow up Visit    History of Present [id number] yo woman who presents for evaluation of a left tongue sore. This has been there for 2-3 months, seems to be about the same size per patient. some irritation and soreness if she rubs it. No bleeding. No trouble eating or drinking. Does smoke, some ETOH use.      8-14-20 FU: no new complaints, biopsy showed scca.  9-11-20 post-op: doing well, healing well after left partial glossectomy, left ulnar free flap; drain with minimal output. pain controlled. breathing well   9-24-20 fu: doing well, tolerating soft foods. had US biopsy performed recently, saw hem/onc and has rad/onc appointment today  9-28-20 FU: pre-treatment PET showing concerning for metastatic nodes on the right side  10-7-20 FU: here for drain removal, minimal output, no significant pain  10-16-20 FU: doing well, no pain. completed simulation and masking  11-6-20 FU: doing well, 2 weeks into chemorad, clinical trial; no significant pain, tolerating PO intake. neck swelling much improved  1-15-21 FU: completed chemorad 12/7, has had some improvement initially but now with significant pain, slurred speech. in a clinical trial with adjuvant immunotherapy   1-29-21 FU: some improvement in pain and slurred speech over past week. biopsy concerning for superficial squam but mostly ulcer, ct scan with some thickening left lateral tongue but otherwise not that impressive  2-12-21 FU: doing well, continues to improve, seems to have swelling and increased slurred speech with each infusion  3-5-21 FU: stable symptoms, pain slightly improved, stopped immunotherapy, last dose was 3 weeks ago  4-2-21 FU: doing well, no significant pain. had CT scan recently that showed no concerning findings. speech improving still. able to tolerate some PO  5-14-21 FU: much improved, tolerating PO only, hasn't used PEG in a month. no pain, no trouble breathing  7-16-21 FU: no new complaints, tolerating PO ,no new pain  10-1-21 FU:  "doing well, no trouble eating, speech continues to improve  12-10-22: no new issues  2-25-22: no new complaints  5-27-22 fu: doing well  9-30-22 FU: no new complaints  1-5-2024 FU: Looks good, no complaints. On synthroid 75mcg     Past Medical History  She has a past medical history of Encounter for general adult medical examination without abnormal findings (10/03/2019), Encounter for general adult medical examination without abnormal findings (12/09/2020), Personal history of other specified conditions (12/17/2020), and Type 2 diabetes mellitus without complications (CMS/HCC) (12/21/2020).    Surgical History  She has a past surgical history that includes Hysterectomy (07/31/2018) and US guided biopsy lymph node superficial (9/21/2020).     Social History  She reports that she has quit smoking. Her smoking use included cigarettes. She has never used smokeless tobacco. She reports that she does not drink alcohol. No history on file for drug use.    Family History  Family History   Problem Relation Name Age of Onset    Endometriosis Sister      Other (CARDIAC DISORDER) Other UNCLE     Endometriosis Other UNCLE     Diabetes Other      Kidney failure Other          Allergies  Patient has no known allergies.     Physical Exam:  NAD, alert  horacio eomi NCAT  Left EAC with moderate cerumen, which was removed  oc/op: tongue stable, flap well healed, Dental extraction sites well-healed  trach site healed  neck well healed, fibrotic s/p radiation treatments, no masses or SHERI    Last Recorded Vitals  Temperature 35.6 °C (96.1 °F), height 1.651 m (5' 5\"), weight 73.3 kg (161 lb 9.6 oz).    Relevant Results      No results found for this or any previous visit (from the past 24 hour(s)).  No results found.      Assessment and Plan  59 yo woman who presents for evaluation of a left lateral tongue lesion showing scca s/p left partial glossectomy, neck dissection, ulnar free flap, found to have contralateral nodes on pre-radiation " pet, had right neck dissection followed by adjuvant chemo/immuno/radiation (on trial) path - 4 cm 16 mm DOI, with PNI, small satellite close at the deep margin ;2/69 nodes pos with 2 mm of ECS ; had significant tongue mucositis with immunotherapy and radiation but now recovered and is about 4 years out from treatment.    -TSH 9/20 18.60, on synthroid 75mcg. Will plan to repeat labs today  -continue routine surveillance in 6 months    Negra Brito MD

## 2024-02-23 ENCOUNTER — TELEMEDICINE (OUTPATIENT)
Dept: ENDOCRINOLOGY | Facility: HOSPITAL | Age: 59
End: 2024-02-23
Payer: COMMERCIAL

## 2024-02-23 DIAGNOSIS — E55.9 VITAMIN D DEFICIENCY: ICD-10-CM

## 2024-02-23 DIAGNOSIS — E03.9 HYPOTHYROIDISM, UNSPECIFIED TYPE: Primary | ICD-10-CM

## 2024-02-23 PROCEDURE — 99214 OFFICE O/P EST MOD 30 MIN: CPT | Performed by: STUDENT IN AN ORGANIZED HEALTH CARE EDUCATION/TRAINING PROGRAM

## 2024-02-23 PROCEDURE — 1036F TOBACCO NON-USER: CPT | Performed by: STUDENT IN AN ORGANIZED HEALTH CARE EDUCATION/TRAINING PROGRAM

## 2024-02-23 NOTE — PROGRESS NOTES
Subjective   Mrs. Maharaj is a 58 year old F with hx of SCC of tongue s/p left partial glossectomy, neck dissection, ulnar free flap, found to have contralateral nodes on pre-radiation pet, had right neck dissection followed by adjuvant chemo/immuno/radiation and hypothyroidism called for management of hypothyroidism  Last seen in Office in Oct 2021  Received radiation on 10/26/2020 to 12/7/2020   Received immunotherapy Atezolizumanb on 10/20 stopped January/February since patient did not tolerate it because of ongoing ulcer pain.  Started in June 2021 on Levothyroxine 50 mcg daily takes it appropriately.  Her Lt4 was increased initially to 75 mcg then 100 mcg (Per patient ~ 2 months)  Takes it appropriately.   Mother had a stroke and has been taking care of her.  In the last 2 months not missing    Hypothyroidism could be due to radiation vs immunotherapy    Energy: Great energy. No naps  Sleep: Goes to bed late. Takes her 20 min to fall asleep Sleeps 4-5 hours of sleep  Weight: Stable   BM: No change  Cold or heat intolerance: Same   Palpitations or tremors: No palpitations no tremors  HF or NS: Once in a while   Cramps: No  Tingling numbness: fingers. Fell with her student   - SOB while lying flat: No  - Dysphagia: No    Has been doing fine from cancer stand point     Latest Reference Range & Units Most Recent 06/10/21 11:55 10/26/21 14:59 12/09/21 08:42 06/09/22 12:48 12/08/22 10:11 03/16/23 16:56 06/14/23 10:43 09/20/23 09:19 01/05/24 10:49   Thyroxine, Free 0.78 - 1.48 ng/dL 1.26  1/5/24 10:49 0.53 (L) 1.22  1.13  1.19 1.00  1.06 0.82 1.26   Thyroid Stimulating Hormone 0.44 - 3.98 mIU/L 5.46 (H)  1/5/24 10:49 30.66 (H) 2.35 1.69 4.99 (H) 5.94 (H)  5.54 (H) 18.60 (H) 5.46 (H)   Vitamin D, 25-Hydroxy, Total ng/mL 34  3/16/23 16:56  23 ! 28 !   34      (L): Data is abnormally low  (H): Data is abnormally high  !: Data is abnormal    Meds:  Vitamin D 1000IU daily  Vitamin C   Meloxicam  Duloxetine   Levothyroxine       Review of Systems  all pertinent systems reviewed and are otherwise negative     Objective   Not done since Phone visit   Physical Exam  Not done since Phone visit   Lab Results   Component Value Date    TSH 5.46 (H) 01/05/2024    FREET4 1.26 01/05/2024        Latest Reference Range & Units Most Recent 06/10/21 11:55 10/26/21 14:59 12/09/21 08:42 06/09/22 12:48 12/08/22 10:11 03/16/23 16:56 06/14/23 10:43 09/20/23 09:19 01/05/24 10:49   Thyroxine, Free 0.78 - 1.48 ng/dL 1.26  1/5/24 10:49 0.53 (L) 1.22  1.13  1.19 1.00  1.06 0.82 1.26   Thyroid Stimulating Hormone 0.44 - 3.98 mIU/L 5.46 (H)  1/5/24 10:49 30.66 (H) 2.35 1.69 4.99 (H) 5.94 (H)  5.54 (H) 18.60 (H) 5.46 (H)   Vitamin D, 25-Hydroxy, Total ng/mL 34  3/16/23 16:56  23 ! 28 !   34          Assessment/Plan   Mrs. Maharaj is a 58 year old F with hx of SCC of tongue s/p left partial glossectomy, neck dissection, ulnar free flap, found to have contralateral nodes on pre-radiation pet, had right neck dissection followed by adjuvant chemo/immuno/radiation and hypothyroidism called for management of hypothyroidism  Last seen in Office in Oct 2021  Received radiation on 10/26/2020 to 12/7/2020   Received immunotherapy Atezolizumanb on 10/20 stopped January/February since patient did not tolerate it because of ongoing ulcer pain.  Started in June 2021 on Levothyroxine 50 mcg daily takes it appropriately.  Her Lt4 was increased initially to 75 mcg then 100 mcg Last increased on 1/5/2024 after TSH: 5.46  Takes it appropriately.   Mother had a stroke and has been taking care of her.  In the last 2 months not missing    Hypothyroidism could be due to radiation vs immunotherapy    Plan:  Repeat TFTs and Vitamin D    RTC in summer in office visit

## 2024-02-26 ENCOUNTER — APPOINTMENT (OUTPATIENT)
Dept: PRIMARY CARE | Facility: CLINIC | Age: 59
End: 2024-02-26
Payer: COMMERCIAL

## 2024-03-18 ENCOUNTER — OFFICE VISIT (OUTPATIENT)
Dept: PRIMARY CARE | Facility: CLINIC | Age: 59
End: 2024-03-18
Payer: COMMERCIAL

## 2024-03-18 ENCOUNTER — APPOINTMENT (OUTPATIENT)
Dept: PRIMARY CARE | Facility: CLINIC | Age: 59
End: 2024-03-18
Payer: COMMERCIAL

## 2024-03-18 VITALS — HEIGHT: 65 IN | BODY MASS INDEX: 26.89 KG/M2 | HEART RATE: 63 BPM | TEMPERATURE: 97.7 F

## 2024-03-18 DIAGNOSIS — Z12.39 BREAST SCREENING: ICD-10-CM

## 2024-03-18 DIAGNOSIS — F41.9 ANXIETY: Primary | ICD-10-CM

## 2024-03-18 DIAGNOSIS — E03.9 HYPOTHYROIDISM, UNSPECIFIED TYPE: ICD-10-CM

## 2024-03-18 DIAGNOSIS — E78.9 LIPID DISORDER: ICD-10-CM

## 2024-03-18 PROCEDURE — 99214 OFFICE O/P EST MOD 30 MIN: CPT | Performed by: FAMILY MEDICINE

## 2024-03-18 PROCEDURE — 1036F TOBACCO NON-USER: CPT | Performed by: FAMILY MEDICINE

## 2024-03-18 NOTE — PROGRESS NOTES
This is a 58-year-old female patient here for follow-up    She says she is under a lot of stress taking care of her mother uncle and her father    I reminded her to get the mammogram done and also come for her annual physical    I [facility] to [facility] for evaluation and treatment for stress and depression    Continue with Cymbalta    She is still complaining of right knee pain due to her injury at work she has been using the orthopedic doctor appointment advised her to continue to see the orthopedic    She is seeing the endocrinologist for hypothyroidism    Advised her to come back for her annual physical

## 2024-03-26 ENCOUNTER — HOSPITAL ENCOUNTER (OUTPATIENT)
Dept: RADIOLOGY | Facility: CLINIC | Age: 59
Discharge: HOME | End: 2024-03-26
Payer: COMMERCIAL

## 2024-03-26 VITALS — WEIGHT: 161.6 LBS | BODY MASS INDEX: 26.92 KG/M2 | HEIGHT: 65 IN

## 2024-03-26 DIAGNOSIS — Z12.39 BREAST SCREENING: ICD-10-CM

## 2024-03-26 PROCEDURE — 77067 SCR MAMMO BI INCL CAD: CPT

## 2024-03-26 PROCEDURE — 77063 BREAST TOMOSYNTHESIS BI: CPT | Performed by: RADIOLOGY

## 2024-03-26 PROCEDURE — 77067 SCR MAMMO BI INCL CAD: CPT | Performed by: RADIOLOGY

## 2024-04-16 ENCOUNTER — TELEPHONE (OUTPATIENT)
Dept: RADIATION ONCOLOGY | Facility: HOSPITAL | Age: 59
End: 2024-04-16
Payer: COMMERCIAL

## 2024-04-16 NOTE — TELEPHONE ENCOUNTER
Called pt to remind of appointment on 04/17/24 at 1:00. Pt's phone went to voicemail left number if needs to reschedule.

## 2024-04-29 ENCOUNTER — TELEPHONE (OUTPATIENT)
Dept: RADIATION ONCOLOGY | Facility: HOSPITAL | Age: 59
End: 2024-04-29
Payer: COMMERCIAL

## 2024-04-29 ASSESSMENT — ENCOUNTER SYMPTOMS
EXTREMITY WEAKNESS: 0
FEVER: 0
DIFFICULTY URINATING: 0
ABDOMINAL DISTENTION: 0
SORE THROAT: 0
WHEEZING: 0
FATIGUE: 0
LEG SWELLING: 0
ABDOMINAL PAIN: 0
CHILLS: 0
DEPRESSION: 0
HEMATURIA: 0
DIZZINESS: 0
UNEXPECTED WEIGHT CHANGE: 0
BLOOD IN STOOL: 0
NERVOUS/ANXIOUS: 0
VOMITING: 0
DIARRHEA: 0
COUGH: 0
CONSTIPATION: 0
SPEECH DIFFICULTY: 0
SHORTNESS OF BREATH: 0
EYE PROBLEMS: 1
TROUBLE SWALLOWING: 0
CHEST TIGHTNESS: 0
HEADACHES: 0
ADENOPATHY: 0
NUMBNESS: 0
CONFUSION: 0
ARTHRALGIAS: 1
LIGHT-HEADEDNESS: 0
SEIZURES: 0
NAUSEA: 0

## 2024-05-01 ENCOUNTER — HOSPITAL ENCOUNTER (OUTPATIENT)
Dept: RADIATION ONCOLOGY | Facility: HOSPITAL | Age: 59
Setting detail: RADIATION/ONCOLOGY SERIES
Discharge: HOME | End: 2024-05-01
Payer: COMMERCIAL

## 2024-05-01 ENCOUNTER — APPOINTMENT (OUTPATIENT)
Dept: VASCULAR MEDICINE | Facility: HOSPITAL | Age: 59
End: 2024-05-01
Payer: COMMERCIAL

## 2024-05-07 ENCOUNTER — TELEPHONE (OUTPATIENT)
Dept: RADIATION ONCOLOGY | Facility: HOSPITAL | Age: 59
End: 2024-05-07
Payer: COMMERCIAL

## 2024-05-31 ENCOUNTER — OFFICE VISIT (OUTPATIENT)
Dept: ORTHOPEDIC SURGERY | Facility: HOSPITAL | Age: 59
End: 2024-05-31
Payer: COMMERCIAL

## 2024-05-31 VITALS — HEIGHT: 65 IN | WEIGHT: 161 LBS | BODY MASS INDEX: 26.82 KG/M2

## 2024-05-31 DIAGNOSIS — M17.10 ARTHRITIS OF KNEE: Primary | ICD-10-CM

## 2024-05-31 PROCEDURE — 2500000004 HC RX 250 GENERAL PHARMACY W/ HCPCS (ALT 636 FOR OP/ED): Performed by: ORTHOPAEDIC SURGERY

## 2024-05-31 PROCEDURE — 20610 DRAIN/INJ JOINT/BURSA W/O US: CPT | Mod: RT | Performed by: ORTHOPAEDIC SURGERY

## 2024-05-31 PROCEDURE — 99213 OFFICE O/P EST LOW 20 MIN: CPT | Performed by: ORTHOPAEDIC SURGERY

## 2024-05-31 PROCEDURE — 2500000005 HC RX 250 GENERAL PHARMACY W/O HCPCS: Performed by: ORTHOPAEDIC SURGERY

## 2024-05-31 RX ORDER — LIDOCAINE HYDROCHLORIDE 10 MG/ML
1 INJECTION INFILTRATION; PERINEURAL
Status: COMPLETED | OUTPATIENT
Start: 2024-05-31 | End: 2024-05-31

## 2024-05-31 RX ORDER — TRIAMCINOLONE ACETONIDE 40 MG/ML
10 INJECTION, SUSPENSION INTRA-ARTICULAR; INTRAMUSCULAR
Status: COMPLETED | OUTPATIENT
Start: 2024-05-31 | End: 2024-05-31

## 2024-05-31 RX ADMIN — LIDOCAINE HYDROCHLORIDE 1 ML: 10 INJECTION, SOLUTION INFILTRATION; PERINEURAL at 09:19

## 2024-05-31 RX ADMIN — TRIAMCINOLONE ACETONIDE 10 MG: 400 INJECTION, SUSPENSION INTRA-ARTICULAR; INTRAMUSCULAR at 09:19

## 2024-05-31 NOTE — PROGRESS NOTES
Still having some symptoms in her right knee with occasional buckling and swelling and aching.    Right knee exam significant for small effusion range of motion 0 to 100 degrees.  Good quadriceps contraction, medial joint line tenderness.    Osteoarthritis right knee    We discussed options for treatment I recommended injection injection was performed and tolerated well.  I have asked patient to call next week to report the results.    This was dictated using voice recognition software and not corrected for grammatical or spelling errors.    L Inj/Asp: R knee on 5/31/2024 9:19 AM  Details: 22 G needle, anterolateral approach  Medications: 10 mg triamcinolone acetonide 40 mg/mL; 1 mL lidocaine 10 mg/mL (1 %)

## 2024-06-18 ENCOUNTER — APPOINTMENT (OUTPATIENT)
Dept: PRIMARY CARE | Facility: CLINIC | Age: 59
End: 2024-06-18
Payer: COMMERCIAL

## 2024-06-18 VITALS
TEMPERATURE: 97.5 F | SYSTOLIC BLOOD PRESSURE: 107 MMHG | DIASTOLIC BLOOD PRESSURE: 66 MMHG | WEIGHT: 160.9 LBS | HEIGHT: 65 IN | BODY MASS INDEX: 26.81 KG/M2 | HEART RATE: 75 BPM

## 2024-06-18 DIAGNOSIS — E55.9 VITAMIN D DEFICIENCY: ICD-10-CM

## 2024-06-18 DIAGNOSIS — Z00.00 HEALTHCARE MAINTENANCE: ICD-10-CM

## 2024-06-18 DIAGNOSIS — Z00.00 ROUTINE GENERAL MEDICAL EXAMINATION AT A HEALTH CARE FACILITY: ICD-10-CM

## 2024-06-18 DIAGNOSIS — M85.80 OSTEOPENIA, UNSPECIFIED LOCATION: ICD-10-CM

## 2024-06-18 DIAGNOSIS — Z87.891 SMOKING HX: ICD-10-CM

## 2024-06-18 DIAGNOSIS — Z79.4 TYPE 2 DIABETES MELLITUS WITHOUT COMPLICATION, WITH LONG-TERM CURRENT USE OF INSULIN (MULTI): ICD-10-CM

## 2024-06-18 DIAGNOSIS — F41.9 ANXIETY: Primary | ICD-10-CM
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
## 2024-06-18 DIAGNOSIS — E11.9 TYPE 2 DIABETES MELLITUS WITHOUT COMPLICATION, WITH LONG-TERM CURRENT USE OF INSULIN (MULTI): ICD-10-CM

## 2024-06-18 DIAGNOSIS — C02.9 TONGUE CANCER (MULTI): ICD-10-CM

## 2024-06-18 RX ORDER — DULOXETIN HYDROCHLORIDE 60 MG/1
60 CAPSULE, DELAYED RELEASE ORAL DAILY
Qty: 30 CAPSULE | Refills: 5 | Status: SHIPPED | OUTPATIENT
Start: 2024-06-18 | End: 2024-12-15

## 2024-06-18 NOTE — PROGRESS NOTES
"Subjective   Patient ID: Mandi Maharaj is a 59 y.o. female who presents for Annual Exam.    HPI     Review of Systems   All other systems reviewed and are negative.      Objective   /66   Pulse 75   Temp 36.4 °C (97.5 °F)   Ht 1.651 m (5' 5\")   Wt 73 kg (160 lb 14.4 oz)   BMI 26.78 kg/m²     Physical Exam  Cardiovascular:      Rate and Rhythm: Regular rhythm.      Heart sounds: Normal heart sounds.   Abdominal:      Palpations: Abdomen is soft.   Musculoskeletal:         General: Normal range of motion.   Skin:     General: Skin is warm.   Neurological:      General: No focal deficit present.      Mental Status: She is alert.   Psychiatric:         Mood and Affect: Mood normal.         Assessment/Plan     This is a 59-year-old female patient who is here for her annual physical exam    She is undergoing a lot of stress since her mother is extremely sick had a stroke recently and going through difficulties taking care of her mother and dealing with her siblings    [facility] and also advised her to take Cymbalta 60 mg/day    I also spoke to her about core strengthening exercises and referred her for physical therapy since she had a fall within the last 4 weeks.  She sees the orthopedic doctor for right knee pain injury she says the right knee gave out and she lost her balance    I also ordered bone density test    Recently done mammogram is negative    I reminded patient to see the ophthalmologist    She has a history of smoking over 20 pack history therefore I ordered CT lung for early detection of lung cancer it was a shared decision and she agreed to undergo the CAT scan    I advised her to come back in 3 months       Patient was identified as a fall risk. Risk prevention instructions provided.  "

## 2024-06-18 NOTE — PATIENT INSTRUCTIONS
Please go for counseling I am going to refer you to [facility] for help to handle stress and also continue your Cymbalta or duloxetine I am going to increase it to 60 mg that helps with depression anxiety    Continue your levothyroxine    Please work on your core muscle strength and your leg muscles and I have referred you for physical therapy and follow the guidelines still help prevent falls at home      Follow-up in 3  months      Ways to Help Prevent Falls at Home    Quick Tips   ? Ask for help if you need it. Most people want to help!   ? Get up slowly after sitting or laying down   ? Wear a medical alert device or keep cell phone in your pocket   ? Use night lights, especially areas near a bathroom   ? Keep the items you use often within reach on a small stool or end table   ? Use an assistive device such as walker or cane, as directed by provider/physical therapy   ? Use a non-slip mat and grab bars in your bathroom. Look for home health sections for best options     Other Areas to Focus On   ? Exercise and nutrition: Regular exercise or taking a falls prevention class are great ways improve strength and balance. Don’t forget to stay hydrated and bring a snack!   ? Medicine side effects: Some medicines can make you sleepy or dizzy, which could cause a fall. Ask your healthcare provider about the side effects your medicines could cause. Be sure to let them know if you take any vitamins or supplements as well.   ? Tripping hazards: Remove items you could trip on, such as loose mats, rugs, cords, and clutter. Wear closed toe shoes with rubber soles.   ? Health and wellness: Get regular checkups with your healthcare provider, plus routine vision and hearing screenings. Talk with your healthcare provider about:   o Your medicines and the possible side effects - bring them in a bag if that is easier!   o Problems with balance or feeling dizzy   o Ways to promote bone health, such as Vitamin D and calcium  supplements   o Questions or concerns about falling     *Ask your healthcare team if you have questions     ©[facility], 2022

## 2024-06-19 ENCOUNTER — TELEPHONE (OUTPATIENT)
Dept: HEMATOLOGY/ONCOLOGY | Facility: HOSPITAL | Age: 59
End: 2024-06-19
Payer: COMMERCIAL

## 2024-06-19 ENCOUNTER — APPOINTMENT (OUTPATIENT)
Dept: HEMATOLOGY/ONCOLOGY | Facility: HOSPITAL | Age: 59
End: 2024-06-19
Payer: COMMERCIAL

## 2024-06-19 LAB
25(OH)D3 SERPL-MCNC: 40 NG/ML (ref 30–100)
ALBUMIN SERPL BCP-MCNC: 4.1 G/DL (ref 3.4–5)
ALP SERPL-CCNC: 57 U/L (ref 33–110)
ALT SERPL W P-5'-P-CCNC: 9 U/L (ref 7–45)
ANION GAP SERPL CALC-SCNC: 13 MMOL/L (ref 10–20)
AST SERPL W P-5'-P-CCNC: 15 U/L (ref 9–39)
BASOPHILS # BLD AUTO: 0.03 X10*3/UL (ref 0–0.1)
BASOPHILS NFR BLD AUTO: 0.7 %
BILIRUB SERPL-MCNC: 0.5 MG/DL (ref 0–1.2)
BUN SERPL-MCNC: 13 MG/DL (ref 6–23)
CALCIUM SERPL-MCNC: 9.6 MG/DL (ref 8.6–10.6)
CHLORIDE SERPL-SCNC: 105 MMOL/L (ref 98–107)
CHOLEST SERPL-MCNC: 223 MG/DL (ref 0–199)
CHOLESTEROL/HDL RATIO: 2.5
CO2 SERPL-SCNC: 28 MMOL/L (ref 21–32)
CREAT SERPL-MCNC: 0.78 MG/DL (ref 0.5–1.05)
EGFRCR SERPLBLD CKD-EPI 2021: 88 ML/MIN/1.73M*2
EOSINOPHIL # BLD AUTO: 0.14 X10*3/UL (ref 0–0.7)
EOSINOPHIL NFR BLD AUTO: 3.3 %
ERYTHROCYTE [DISTWIDTH] IN BLOOD BY AUTOMATED COUNT: 13.7 % (ref 11.5–14.5)
GLUCOSE SERPL-MCNC: 67 MG/DL (ref 74–99)
HCT VFR BLD AUTO: 36.6 % (ref 36–46)
HDLC SERPL-MCNC: 89 MG/DL
HGB BLD-MCNC: 11.5 G/DL (ref 12–16)
IMM GRANULOCYTES # BLD AUTO: 0.01 X10*3/UL (ref 0–0.7)
IMM GRANULOCYTES NFR BLD AUTO: 0.2 % (ref 0–0.9)
LDLC SERPL CALC-MCNC: 121 MG/DL
LYMPHOCYTES # BLD AUTO: 1.13 X10*3/UL (ref 1.2–4.8)
LYMPHOCYTES NFR BLD AUTO: 27 %
MCH RBC QN AUTO: 29.5 PG (ref 26–34)
MCHC RBC AUTO-ENTMCNC: 31.4 G/DL (ref 32–36)
MCV RBC AUTO: 94 FL (ref 80–100)
MONOCYTES # BLD AUTO: 0.36 X10*3/UL (ref 0.1–1)
MONOCYTES NFR BLD AUTO: 8.6 %
NEUTROPHILS # BLD AUTO: 2.52 X10*3/UL (ref 1.2–7.7)
NEUTROPHILS NFR BLD AUTO: 60.2 %
NON HDL CHOLESTEROL: 134 MG/DL (ref 0–149)
NRBC BLD-RTO: 0 /100 WBCS (ref 0–0)
PLATELET # BLD AUTO: 177 X10*3/UL (ref 150–450)
POTASSIUM SERPL-SCNC: 4.3 MMOL/L (ref 3.5–5.3)
PROT SERPL-MCNC: 6.7 G/DL (ref 6.4–8.2)
RBC # BLD AUTO: 3.9 X10*6/UL (ref 4–5.2)
SODIUM SERPL-SCNC: 142 MMOL/L (ref 136–145)
TRIGL SERPL-MCNC: 65 MG/DL (ref 0–149)
TSH SERPL-ACNC: 1.5 MIU/L (ref 0.44–3.98)
VLDL: 13 MG/DL (ref 0–40)
WBC # BLD AUTO: 4.2 X10*3/UL (ref 4.4–11.3)

## 2024-06-19 NOTE — TELEPHONE ENCOUNTER
Attempted to contact patient to see if she is coming for her visit today with neda Ojeda. Call went to  and her  box is full and cannot take another message.     Team aware of attempted outreach.

## 2024-07-03 NOTE — PROGRESS NOTES
Patient ID: Mandi Maharaj is a 59 y.o. female.  Diagnosis: Squamous Cell Carcinoma of tongue  Staging: pT3 N3b Mx     Providers:  ENT Surgeon: Dr. José Elliott  [facility]: Dr. Kyunghee Burkitt  [facility]: Dr. Gutierrez    Surgery  8/27/20: Left partial glossectomy, left neck dissection  10/1/20: Right neck dissection    Prior Therapy  0/20/20 - 2/4/21: Clinical Trial RTOG 1206 ARM 4 with weekly Cisplatin (40mg/m2) x 6, with Radiation (66gy in 30fx) + Atezolizumab (1200mg) Q3 weeks x3 with concurrent chemoradiation. And then 3 cycles of adjuvant immunotherapy with Atezolizumab.  D/c’ed on 2/4/21 d/t ongoing ulcer related pain.     ONCOLOGIC HISTORY  - 8/7/20: Presented initially to Dr. Elliott with a tongue lesion that’s been there for 2-3 months. Punch biopsy showed invasive poorly differentiated keratinizing squamous cell carcinoma   - 8/21/20 CT chest with contrast showed 1.1cm (L) and 1.2 cm (R) axillary lymph nodes.  - 8/27/20: Triple endoscopy, Left partial glossectomy, Left neck dissection, tracheostomy, and reconstruction with left ulnar free flap; PEG tube placement   - 9/11/22 [facility] Tumor Board: Presented with Tongue lesion with positive biopsy underwent surgical resection with negative margins, findings c/w keratinizing squamous cell carcinoma, pT3 N3b Mx .Rec: Axillary lymph node biopsy; adjuvant chemoradiation  - 9/21/20: US FNA of the left axillary LN, path showed negative for malignancy.    - 9/28/20: PET/CT showing concerning lesion in Right neck.    - 10/1/20: S/p Rright neck dissection (level 1b-3), pathology showed metastatic SCC in 2/3 lymph nodes in level 1B. Metastatic SCC in 1/10 lymph nodes level 2A&3 with positive extranodal extension.  - 10/20/20: Patient enrolled in RTOG 1206 ARM4 clinical trial (Dr. Gutierrez's study).  Started Atezolizumanb on 10/20.    - 10/27/20: started weekly cisplatin 40mg/m2 on 10/27, 11/3, 11/10 (with 2nd Atezolizumab), 11/17, 11/24 and 12/1 (3 rd Atezolizumab) with maintenance dose  (12/22, 1/14, 2/14). She couldn’t finish maintenance dose of Stezolizumab due to ongoing ulcer related pain.  - 1/15/21 Tongue lesion c/f recurrence. Left tongue biopsy showed invasive squamous cell carcinoma. Right tongue biopsy showed granulation tissue  - 1/26/21 CT neck showed lesion (1.9 x 1cm) at the junction between the left lateral and anterior tongue and the buccal mucosa, but no other lesions.  - 1/29/21 [facility] Tumor Board: Presenting symptoms: History of left tongue cancer S/P resection, recently completed adjuvant chemoradiation with increased pain and found to have left tongue lesion, biopsy revealing at least superficially invasive squamous  cell carcinoma, T2-3 N0 M0. Rec: Repeat biopsies (deeper) and represent  - 1/29/21 Left tongue (deeper) biopsy showed: ulcerated squamous mucosa with submucosal fibrosis, chronic inflammation, vascular proliferation; no evidence of high great dysphasia/malignancy        Past Medical History:   Past Medical History:  10/03/2019: Encounter for general adult medical examination without   abnormal findings      Comment:  Physical exam, annual  12/09/2020: Encounter for general adult medical examination without   abnormal findings      Comment:  Physical exam, annual  12/17/2020: Personal history of other specified conditions      Comment:  History of tachycardia  12/21/2020: Type 2 diabetes mellitus without complications (Multi)      Comment:  Controlled diabetes mellitus   Surgical History:    Past Surgical History:   Procedure Laterality Date    HYSTERECTOMY  07/31/2018    Hysterectomy    US GUIDED BIOPSY LYMPH NODE SUPERFICIAL  9/21/2020    US GUIDED BIOPSY LYMPH NODE SUPERFICIAL 9/21/2020 [facility]      Family History:    Family History   Problem Relation Name Age of Onset    Endometriosis Sister      Other (CARDIAC DISORDER) Other UNCLE     Endometriosis Other UNCLE     Diabetes Other      Kidney failure Other       Family Oncology History:    Cancer-related  family history is not on file.  Social History:    Social History     Tobacco Use    Smoking status: Former     Current packs/day: 0.00     Types: Cigarettes     Quit date: 2019     Years since quittin.5    Smokeless tobacco: Never   Substance Use Topics    Alcohol use: Never    Drug use: Never          Subjective   Chief Complaint: Squamous Cell Carcinoma of Tongue    HPI  Mandi Maharaj is a 59 y.o. year old female patient with Squamous Cell Carcinoma of tongue     Interval History  She returns today for 3 year 5 months follow up, she reports the following:    She is feeling at her baseline, overall all doing well.  Eating well and appetite is good. No dysphagia. Tolerating regular diet.  She had all her teeth removed and is waiting on dentures to be made.   Dry mouth has improved, she's also using Biotene and lozenges, drinking more water.   Bowels are moving, every now a then has constipation. Taking Miralax PRN.    No hearing change, no tinnitus.   She has some numbness in right hand due an injury sustained at work. She had a rotator cuff injury, and injured her hand. Overall right hand has no decreased function.     Unfortunately her mom has had multiple strokes and is not doing well. Patient has been taking care of multiple sick family members and has a lot of pressure on her.     ROS  Review of Systems   Constitutional:  Negative for chills and fever.   HENT:   Negative for hearing loss, lump/mass, mouth sores, nosebleeds, sore throat, tinnitus and trouble swallowing.    Respiratory:  Negative for cough and shortness of breath.    Cardiovascular:  Negative for chest pain and leg swelling.   Gastrointestinal:  Negative for abdominal pain, constipation, diarrhea, nausea and vomiting.   Musculoskeletal:  Negative for arthralgias.   Skin:  Negative for rash.   Neurological:  Negative for headaches, light-headedness and numbness.       Allergies  No Known Allergies     Medications  Current Outpatient  Medications   Medication Instructions    ascorbic acid (VITAMIN C ORAL) oral    cholecalciferol (Vitamin D-3) 1,250 mcg (50,000 unit) capsule oral    cyclobenzaprine (FLEXERIL) 10 mg, oral, 2 times daily PRN    DULoxetine (Cymbalta) 30 mg DR capsule First week take 1 capsule at night then this from the second week start taking 2 capsules at night    DULoxetine (CYMBALTA) 60 mg, oral, Daily, Do not crush or chew.    fluticasone (Flonase) 50 mcg/actuation nasal spray 1-2 sprays, nasal, Daily    levothyroxine (SYNTHROID, LEVOXYL) 100 mcg, oral, Daily before breakfast    loratadine (Claritin) 10 mg tablet 1 tablet, oral, Daily    meloxicam (MOBIC) 15 mg, oral, Daily          Objective   VS: /72 (BP Location: Left arm, Patient Position: Sitting, BP Cuff Size: Adult)   Pulse 69   Resp 18   SpO2 99%   Weight: Daily Weight  07/05/24 : 70 kg (154 lb 6.4 oz)  06/18/24 : 73 kg (160 lb 14.4 oz)  05/31/24 : 73 kg (161 lb)  03/26/24 : 73.3 kg (161 lb 9.6 oz)  01/05/24 : 73.3 kg (161 lb 9.6 oz)  12/14/23 : 74.3 kg (163 lb 12.8 oz)  12/12/23 : 72.8 kg (160 lb 6.4 oz)      Physical Exam  Vitals reviewed.   Constitutional:       Appearance: Normal appearance. She is well-developed and normal weight.   HENT:      Head: Normocephalic and atraumatic.      Right Ear: Hearing and external ear normal. No tenderness.      Left Ear: Hearing and external ear normal. No tenderness.      Nose: Nose normal.      Mouth/Throat:      Lips: Pink.      Mouth: Mucous membranes are moist. No injury or oral lesions.      Tongue: No lesions.   Eyes:      General: Lids are normal.      Extraocular Movements: Extraocular movements intact.      Conjunctiva/sclera: Conjunctivae normal.      Pupils: Pupils are equal, round, and reactive to light.   Neck:      Thyroid: No thyroid mass.      Comments: Left neck muscle stiffness  Cardiovascular:      Rate and Rhythm: Normal rate and regular rhythm.      Pulses: Normal pulses.      Heart sounds: No  murmur heard.     No gallop.   Pulmonary:      Effort: Pulmonary effort is normal. No respiratory distress.      Breath sounds: Normal breath sounds and air entry. No stridor. No wheezing.   Abdominal:      General: Abdomen is flat. Bowel sounds are normal. There is no distension or abdominal bruit.      Palpations: Abdomen is soft. There is no mass.      Tenderness: There is no abdominal tenderness.   Musculoskeletal:         General: Normal range of motion.      Cervical back: Full passive range of motion without pain, normal range of motion and neck supple. No signs of trauma. Normal range of motion.      Right lower leg: No edema.      Left lower leg: No edema.   Lymphadenopathy:      Cervical: No cervical adenopathy.      Upper Body:      Right upper body: No axillary adenopathy.      Left upper body: No axillary adenopathy.   Skin:     General: Skin is warm and dry.      Comments: No new skin lesions   Neurological:      General: No focal deficit present.      Mental Status: She is alert and oriented to person, place, and time. Mental status is at baseline.      Gait: Gait is intact.   Psychiatric:         Attention and Perception: Attention normal.         Mood and Affect: Mood and affect normal.         Behavior: Behavior is cooperative.         Diagnostic Results   Labs  Labs from 6/18/24 reviewed and are at patient's baseline                         Images       Pathology  Lab Results   Component Value Date    PATHREP  01/29/2021     Name NAN SWAN                                                                                                   Accession #: [id number]            Pathologist:                   DOTTIE HALL MD  Date of Procedure:    1/29/2021  Date Received:          1/29/2021  Date Reported           2/4/2021  Submitting Physician:   PIPO HAIRSTON MD  Location:                    [facility] External #                                                                    FINAL  "DIAGNOSIS  LEFT TONGUE, BIOPSY:  --ULCERATED SQUAMOUS MUCOSA WITH SUBMUCOSAL FIBROSIS, CHRONIC INFLAMMATION, AND  VASCULAR PROLIFERATION; NO EVIDENCE OF HIGH-GRADE DYSPLASIA OR MALIGNANCY. SEE  NOTE.    Note: Deeper levels were reviewed.    merissa    The gross and/or microscopic findings were reviewed in conjunction with  pathology resident, Mason Hollis M.D.                                                                                                                                                                                                                                                                                                                                                                                                                                                                                       Electronically Signed Out By DOTTIE HALL MD/MERISSA  By the signature on this report, the individual or group listed as making the  Final Interpretation/Diagnosis certifies that they have reviewed this case.           Clinical History:  Fixative (A): FORMALIN  Clinical Diagnosis History: Tongue cancer - (C02.9)    Specimens Submitted As:  A: LEFT TONGUE     Gross Description:  Received in formalin, labeled with the patient's name and hospital number and  \"L-tongue\", are 2 fragments of light tan soft tissue aggregating to 1.1 x 0.4 x  0.4 cm.  The specimen is submitted in toto in one cassette.  LMP    lmp/2/1/2021               [facility]  Department of Pathology   [address] [place] [address]        PATHREP  01/15/2021     Name NAN SWANBridgette                                                                                                   Accession #: [id number]            Pathologist:                   MIKA DIAZ DMD.  Date of Procedure:    1/15/2021  Date Received:          1/15/2021  Date Reported           1/25/2021  Submitting Physician:   " PIPO HAIRSTON MD  Location:                    [facility] External #                                                                    FINAL DIAGNOSIS  A.  TONGUE, LEFT, BIOPSY:    -- INVASIVE SQUAMOUS CELL CARCINOMA, SEE NOTE.     NOTE: Sections show squamous cell carcinoma in a setting of an extensive ulcer.  Ulceration and tangential processing preclude exact measurement of depth of  invasion but the lesion is at least superficially invasive. Multiple levels  were examined.     B.  TONGUE, RIGHT, BIOPSY:    -- CARCINOMA IN SITU, ULCER, GRANULATION TISSUE, DENSE CHRONIC INFLAMMATORY  INFILTRATE, AND DEGENERATED FAT, SEE NOTE.     NOTE: Multiple levels were examined.      at    : Dr. Alon Potts     The gross and/or microscopic findings were reviewed in conjunction with  pathology resident, Crystal Kay D.O.                                                                                                                                                                                                                                                                                                                                                                                                                                                                                       Electronically Signed Out By MIKA DIAZ DMD./LUDWIG  By the signature on this report, the individual or group listed as making the  Final Interpretation/Diagnosis certifies that they have reviewed this case.           Clinical History:  Fixative (A): FORMALIN  Clinical Diagnosis History: Tongue lesion - (K14.8)    Patient with prior history of squamous cell carcinoma of tongue treated with  surgery and recently completed chemoradiation, now with concerns for  recurrence, undergoing biopsy.       Specimens Submitted As:  A: LEFT TONGUE   B: RIGHT TONGUE     Gross Description:  A:  Received in formalin, labeled with the patient's  "name and hospital number  and \"left tongue\", is a fragment of tan, soft tissue measuring 0.5 x 0.3 x 0.2  cm.  The specimen is submitted in toto in one cassette.  SBS     B:  Received in formalin, labeled with the patient's name and hospital number  and \"right tongue\", is a fragment of tan, soft tissue measuring 0.7 x 0.4 x 0.2  cm.  The specimen is submitted in toto in one cassette.  SBS      sbs/1/18/2021               [facility]  Department of Pathology   [address] [place] [address]             Assessment/Plan   ASSESSMENT  Mandi Maharaj is a 59 y.o. female with h/o squamous cell carcinoma of tongue, [id number]. S/p left partial  glossectomy, left neck dissection on 8/27/20. Right neck dissection on 10/1/20. Enrolled in trail RTOG 1206 Arm 4 with weekly cisplatin with radiation + Atezolizumab. Received 6 cycles of Cisplatin (40mg/m2), 66gy RT in 30fx, and 6 doses of Q3week Atezolizumab  (1200mg). Last dose 2/4/21.     # Squamous Cell Carcinoma of Tongue, pT3 N3b Mx  - 8/7/20 Biopsy of tongue lesion showed SCCa.   - 8/21/20 CT chest showed 1.1cm (L) and 1.2 cm (R) axillary lymphadenopathy.  US FNA of the left axillary LN was done on 9/21/20 which was negative for malignancy.   - 8/27/20 s/p left partial glossectomy, left ND, 4.1 cm left lateral tongue lesion, 16mm DOI with PNI, small satellite close at the deep, 2/46 nodes positive with ECS (less than 2mm).    - 9/28/20 PET/CT showed concerning lesion on right side neck and had right neck dissection (level 1b-3) on 10/1/20. Pathology showed metastatic  SCC in 2/3 lymph nodes in level 1B.  Metastatic SCC in 1/10 lymph nodes level 2A&3 with positive extranodal extension.    - 10/20/20 Enrolled in RTOG 1206 ARM4 with chemoXRT with weekly Cisplatin, RT and  Atezolizumab.  - 1/15/21 Tongue lesion c/f recurrence. Left tongue biopsy showed invasive squamous cell carcinoma. Right tongue biopsy showed granulation tissue  - " 1/26/21 CT neck showed lesion (1.9 x 1cm) at the junction between the left lateral and anterior tongue and the buccal mucosa, but no other lesions.  - 1/29/21 [facility] Tumor Board: History of left tongue cancer S/P resection, recently completed adjuvant chemoradiation with increased pain and found to have left tongue  lesion, biopsy revealing at least superficially invasive squamous cell carcinoma, T2-3 N0 M0. Rec: Repeat biopsies (deeper) and represent  - 1/29/21 Left tongue (deeper) biopsy showed: ulcerated squamous mucosa with submucosal fibrosis, chronic inflammation, vascular proliferation; no evidence of high great dysphasia/malignancy   - Due to ongoing ulcer related pain with Atezolizumab, maintenance Atezolizumab was not initiated (last dose on 2/14).  Restaging CT head and neck  (3/15, 3/18/21) showed  JENNIFER.  - 5/27/22 JENNIFER on exam by Dr. Elliott  - 9/30/22 JENNIFER on exam by Dr. Elliott.  - 1/27/23 JENNIFER on exam by Dr. Elliott. Due for follow up  - 7/5/24: Patient doing well from H&N stand point. Chronic SE of xerostomia, neck stiffness is stable/improving.  Peripheral neuropathy is not bothersome, she is taking Duloxetine 60mg QD    # Hypothyroidism  - 12/8/22: TSH 5.94 (H) T4: 1.00  - 6/14/23: TSH 5.54, T4 1.06  - 6/18/24: TSH 1.5. Cont Levothyroxine 100mcg QAM     PLAN:  - RTC according to RTOG 1206 trial protocol. Clinical trials nurse to schedule FUV  - Follow up with PCP for right shoulder pain/right carpal tunnel syndrome  - Continue follow up with Dr. Elliott, next appt July 2024  - Continue follow up with Dentist

## 2024-07-05 ENCOUNTER — OFFICE VISIT (OUTPATIENT)
Dept: OTOLARYNGOLOGY | Facility: HOSPITAL | Age: 59
End: 2024-07-05
Payer: COMMERCIAL

## 2024-07-05 ENCOUNTER — OFFICE VISIT (OUTPATIENT)
Dept: HEMATOLOGY/ONCOLOGY | Facility: HOSPITAL | Age: 59
End: 2024-07-05
Payer: COMMERCIAL

## 2024-07-05 VITALS — WEIGHT: 154.4 LBS | HEIGHT: 65 IN | BODY MASS INDEX: 25.72 KG/M2 | TEMPERATURE: 97.7 F

## 2024-07-05 VITALS
RESPIRATION RATE: 18 BRPM | OXYGEN SATURATION: 99 % | SYSTOLIC BLOOD PRESSURE: 123 MMHG | DIASTOLIC BLOOD PRESSURE: 72 MMHG | HEART RATE: 69 BPM
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
## 2024-07-05 DIAGNOSIS — Z85.89 ENCOUNTER FOR FOLLOW-UP SURVEILLANCE OF HEAD AND NECK CANCER: ICD-10-CM

## 2024-07-05 DIAGNOSIS — Z85.810 HISTORY OF TONGUE CANCER: Primary | ICD-10-CM

## 2024-07-05 DIAGNOSIS — K11.7 XEROSTOMIA DUE TO RADIOTHERAPY: ICD-10-CM

## 2024-07-05 DIAGNOSIS — G62.89 OTHER POLYNEUROPATHY: ICD-10-CM

## 2024-07-05 DIAGNOSIS — Z08 ENCOUNTER FOR FOLLOW-UP SURVEILLANCE OF HEAD AND NECK CANCER: ICD-10-CM

## 2024-07-05 DIAGNOSIS — M43.6 NECK STIFFNESS: ICD-10-CM

## 2024-07-05 DIAGNOSIS — C02.9 TONGUE CANCER (MULTI): Primary | ICD-10-CM

## 2024-07-05 PROCEDURE — 99214 OFFICE O/P EST MOD 30 MIN: CPT | Performed by: STUDENT IN AN ORGANIZED HEALTH CARE EDUCATION/TRAINING PROGRAM

## 2024-07-05 PROCEDURE — 99213 OFFICE O/P EST LOW 20 MIN: CPT | Performed by: OTOLARYNGOLOGY

## 2024-07-05 ASSESSMENT — ENCOUNTER SYMPTOMS
CHILLS: 0
SORE THROAT: 0
FEVER: 0
COUGH: 0
SHORTNESS OF BREATH: 0
CONSTIPATION: 0
LIGHT-HEADEDNESS: 0
ARTHRALGIAS: 0
TROUBLE SWALLOWING: 0
HEADACHES: 0
NAUSEA: 0
ABDOMINAL PAIN: 0
DIARRHEA: 0
VOMITING: 0
LEG SWELLING: 0
NUMBNESS: 0

## 2024-07-05 ASSESSMENT — PATIENT HEALTH QUESTIONNAIRE - PHQ9
2. FEELING DOWN, DEPRESSED OR HOPELESS: NOT AT ALL
1. LITTLE INTEREST OR PLEASURE IN DOING THINGS: NOT AT ALL
SUM OF ALL RESPONSES TO PHQ9 QUESTIONS 1 & 2: 0

## 2024-07-05 ASSESSMENT — PAIN SCALES - GENERAL: PAINLEVEL: 0-NO PAIN

## 2024-07-05 NOTE — PROGRESS NOTES
ENT Follow up Visit    History of Present Shrdzrm58 yo woman who presents for evaluation of a left tongue sore. This has been there for 2-3 months, seems to be about the same size per patient. some irritation and soreness if she rubs it. No bleeding. No trouble eating or drinking. Does smoke, some ETOH use.      8-14-20 FU: no new complaints, biopsy showed scca.  9-11-20 post-op: doing well, healing well after left partial glossectomy, left ulnar free flap; drain with minimal output. pain controlled. breathing well   9-24-20 fu: doing well, tolerating soft foods. had US biopsy performed recently, saw hem/onc and has rad/onc appointment today  9-28-20 FU: pre-treatment PET showing concerning for metastatic nodes on the right side  10-7-20 FU: here for drain removal, minimal output, no significant pain  10-16-20 FU: doing well, no pain. completed simulation and masking  11-6-20 FU: doing well, 2 weeks into chemorad, clinical trial; no significant pain, tolerating PO intake. neck swelling much improved  1-15-21 FU: completed chemorad 12/7, has had some improvement initially but now with significant pain, slurred speech. in a clinical trial with adjuvant immunotherapy   1-29-21 FU: some improvement in pain and slurred speech over past week. biopsy concerning for superficial squam but mostly ulcer, ct scan with some thickening left lateral tongue but otherwise not that impressive  2-12-21 FU: doing well, continues to improve, seems to have swelling and increased slurred speech with each infusion  3-5-21 FU: stable symptoms, pain slightly improved, stopped immunotherapy, last dose was 3 weeks ago  4-2-21 FU: doing well, no significant pain. had CT scan recently that showed no concerning findings. speech improving still. able to tolerate some PO  5-14-21 FU: much improved, tolerating PO only, hasn't used PEG in a month. no pain, no trouble breathing  7-16-21 FU: no new complaints, tolerating PO ,no new pain  10-1-21 FU:  "doing well, no trouble eating, speech continues to improve  12-10-22: no new issues  2-25-22: no new complaints  5-27-22 fu: doing well  9-30-22 FU: no new complaints  1-5-2024 FU: Looks good, no complaints. On synthroid 75mcg  7-5-24 FU: has been stressed from helping with her mom who had another stroke. Some itchiness in the right ear but otherwise no complaints today     Past Medical History  She has a past medical history of Encounter for general adult medical examination without abnormal findings (10/03/2019), Encounter for general adult medical examination without abnormal findings (12/09/2020), Personal history of other specified conditions (12/17/2020), and Type 2 diabetes mellitus without complications (Multi) (12/21/2020).    Surgical History  She has a past surgical history that includes Hysterectomy (07/31/2018) and US guided biopsy lymph node superficial (9/21/2020).     Social History  She reports that she quit smoking about 5 years ago. Her smoking use included cigarettes. She has never used smokeless tobacco. She reports that she does not drink alcohol and does not use drugs.    Family History  Family History   Problem Relation Name Age of Onset    Endometriosis Sister      Other (CARDIAC DISORDER) Other UNCLE     Endometriosis Other UNCLE     Diabetes Other      Kidney failure Other          Allergies  Patient has no known allergies.     Physical Exam:  NAD, alert  horacio eomi NCAT  Left EAC with moderate cerumen, which was removed  R eac with small scratch from this morning, not infected  oc/op: tongue stable, flap well healed, Dental extraction sites well-healed  trach site healed  neck well healed, fibrotic s/p radiation treatments, no masses or SHERI    Last Recorded Vitals  Temperature 36.5 °C (97.7 °F), height 1.651 m (5' 5\"), weight 70 kg (154 lb 6.4 oz).      Assessment and Plan  59 yo woman who presents for evaluation of a left lateral tongue lesion showing scca s/p left partial glossectomy, " neck dissection, ulnar free flap, found to have contralateral nodes on pre-radiation pet, had right neck dissection followed by adjuvant chemo/immuno/radiation (on trial) path - 4 cm 16 mm DOI, with PNI, small satellite close at the deep margin ;2/69 nodes pos with 2 mm of ECS ; had significant tongue mucositis with immunotherapy and radiation but now recovered and is about 4 years out from treatment.    TSH 6/24 1.5, on synthroid 75mcg    -JENNIFER today  -can use some over the counter oil or unscented lotion for ear itching  -RTC in 1 year for routine surveillance    José Elliott MD

## 2024-07-11 ENCOUNTER — DOCUMENTATION (OUTPATIENT)
Dept: RADIATION ONCOLOGY | Facility: HOSPITAL | Age: 59
End: 2024-07-11
Payer: COMMERCIAL

## 2024-07-11 NOTE — RESEARCH NOTES
STUDY: [id number]  VISIT: [facility]    Clinical Research Specialist reviewed patient record today.    Adverse Events and Concomitant Medications reviewed.    Next appointment and contact information provided.    All questions answered to patient satisfaction.    Monica Mireles  07/11/2024

## 2024-07-17 ENCOUNTER — HOSPITAL ENCOUNTER (OUTPATIENT)
Dept: VASCULAR MEDICINE | Facility: HOSPITAL | Age: 59
Discharge: HOME | End: 2024-07-17
Payer: COMMERCIAL

## 2024-07-17 DIAGNOSIS — Z92.3 HX OF RADIATION THERAPY: ICD-10-CM

## 2024-07-17 DIAGNOSIS — R09.89 OTHER SPECIFIED SYMPTOMS AND SIGNS INVOLVING THE CIRCULATORY AND RESPIRATORY SYSTEMS: ICD-10-CM

## 2024-07-17 DIAGNOSIS — C02.9 TONGUE CANCER (MULTI): ICD-10-CM

## 2024-07-17 PROCEDURE — 93880 EXTRACRANIAL BILAT STUDY: CPT

## 2024-07-19 DIAGNOSIS — E03.9 HYPOTHYROIDISM, UNSPECIFIED TYPE: ICD-10-CM

## 2024-07-21 RX ORDER — LEVOTHYROXINE SODIUM 100 UG/1
100 TABLET ORAL
Qty: 90 TABLET | Refills: 0 | Status: SHIPPED | OUTPATIENT
Start: 2024-07-21

## 2024-07-22 ENCOUNTER — TELEPHONE (OUTPATIENT)
Dept: RADIATION ONCOLOGY | Facility: HOSPITAL | Age: 59
End: 2024-07-22
Payer: COMMERCIAL

## 2024-07-22 NOTE — TELEPHONE ENCOUNTER
Called the patient with the results of her 7/17/24 Carotid ultrasound.  Per the Radiology report:  Right Carotid: Findings are consistent with less than 50% stenosis of the right proximal internal carotid artery. Laminar flow seen by color Doppler. Right external carotid artery appears patent with no evidence of stenosis. The right vertebral artery is patent with antegrade flow. No evidence of hemodynamically significant stenosis in the right subclavian artery. Left Carotid: Findings are consistent with less than 50% stenosis of the left proximal internal carotid artery. Laminar flow seen by color Doppler. Left external carotid artery appears patent with no evidence of stenosis. The left vertebral artery is patent with antegrade flow. No evidence of hemodynamically significant stenosis in the left subclavian artery.  All questions/concerns were addressed to the satisfaction of the patient.

## 2024-09-03 ENCOUNTER — APPOINTMENT (OUTPATIENT)
Dept: PRIMARY CARE | Facility: CLINIC | Age: 59
End: 2024-09-03
Payer: COMMERCIAL

## 2024-11-04 ENCOUNTER — APPOINTMENT (OUTPATIENT)
Dept: OPHTHALMOLOGY | Facility: CLINIC | Age: 59
End: 2024-11-04
Payer: COMMERCIAL

## 2024-11-09 DIAGNOSIS — M25.561 ACUTE PAIN OF RIGHT KNEE: ICD-10-CM

## 2024-11-09 DIAGNOSIS — M25.511 ACUTE PAIN OF RIGHT SHOULDER: ICD-10-CM

## 2024-11-09 DIAGNOSIS — M54.10 RADICULOPATHY, UNSPECIFIED SPINAL REGION: ICD-10-CM

## 2024-11-11 RX ORDER — MELOXICAM 15 MG/1
15 TABLET ORAL DAILY
Qty: 30 TABLET | Refills: 0 | Status: SHIPPED | OUTPATIENT
Start: 2024-11-11

## 2024-11-26 DIAGNOSIS — E03.9 HYPOTHYROIDISM, UNSPECIFIED TYPE: ICD-10-CM

## 2024-11-26 RX ORDER — LEVOTHYROXINE SODIUM 100 UG/1
TABLET ORAL
Qty: 90 TABLET | Refills: 0 | Status: SHIPPED | OUTPATIENT
Start: 2024-11-26

## 2024-12-08 ASSESSMENT — ENCOUNTER SYMPTOMS
DIARRHEA: 0
ABDOMINAL DISTENTION: 0
NERVOUS/ANXIOUS: 0
DEPRESSION: 0
SPEECH DIFFICULTY: 0
CHILLS: 0
UNEXPECTED WEIGHT CHANGE: 0
ADENOPATHY: 0
EYE PROBLEMS: 1
LEG SWELLING: 0
SHORTNESS OF BREATH: 0
CONSTIPATION: 0
ARTHRALGIAS: 1
SEIZURES: 0
HEMATURIA: 0
DIZZINESS: 0
FATIGUE: 0
SORE THROAT: 0
LIGHT-HEADEDNESS: 0
CHEST TIGHTNESS: 0
VOMITING: 0
HEADACHES: 0
NUMBNESS: 0
ABDOMINAL PAIN: 0
NAUSEA: 0
BLOOD IN STOOL: 0
WHEEZING: 0
COUGH: 0
EXTREMITY WEAKNESS: 0
FEVER: 0
DIFFICULTY URINATING: 0
CONFUSION: 0
TROUBLE SWALLOWING: 0
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
## 2024-12-08 NOTE — PROGRESS NOTES
Radiation Oncology Breast Cancer Consult    Staff Physician: ***  Requesting Physician: ***  Date of Service: 12/10/2024     Mandi Maharaj presents today for consultation as requested by No ref. provider found, regarding treatment of ***.     Assessment/Plan    IMPRESSION:   This is a 59 y.o. female who has ***. Clinically, she is a ***, estrogen receptor {POSITIVE/NEGATIVE:02180}, progesterone receptor {POSITIVE/NEGATIVE:21012}, and HER-2/lora {POSITIVE/NEGATIVE:97125}.      OPINIONS AND RECOMMENDATIONS:   I have discussed with Mandi the overall natural history and treatment for *** breast cancer. I explained that typically the goal of treatment is to obtain the most optimal local control. In her case, ***.       Subjective   HISTORY OF PRESENT ILLNESS:   Mandi Maharaj is a 59 y.o. female who presents with *** and is seen in consultation today for ***.     Location: ***   Quality: ***   Severity: ***   Duration: ***   Timing: ***   Context: ***   Modifying Factors: ***     Associated Symptoms: The patient states she {Actions; was/was not:88086} suspicious of any new breast problems. She {ACTIONS; DENIES/REPORTS:60396} new lumps or bumps, skin changes, or nipple discharge.      FAMILY HISTORY: family history includes CARDIAC DISORDER in an other family member; Diabetes in an other family member; Endometriosis in her sister and another family member; Kidney failure in an other family member.    PAST MEDICAL HISTORY:  has a past medical history of Encounter for general adult medical examination without abnormal findings (10/03/2019), Encounter for general adult medical examination without abnormal findings (12/09/2020), Personal history of other specified conditions (12/17/2020), and Type 2 diabetes mellitus without complications (Multi) (12/21/2020).    PAST SURGICAL HISTORY:  has a past surgical history that includes Hysterectomy (07/31/2018) and US guided biopsy lymph node superficial (9/21/2020).      MEDICATIONS:   Current Outpatient Medications   Medication Sig Dispense Refill    ascorbic acid (VITAMIN C ORAL) Take by mouth.      cholecalciferol (Vitamin D-3) 1,250 mcg (50,000 unit) capsule Take by mouth.      cyclobenzaprine (Flexeril) 10 mg tablet Take 1 tablet (10 mg) by mouth 2 times a day as needed for muscle spasms for up to 10 days. 20 tablet 0    DULoxetine (Cymbalta) 30 mg DR capsule First week take 1 capsule at night then this from the second week start taking 2 capsules at night 60 capsule 11    DULoxetine (Cymbalta) 60 mg DR capsule Take 1 capsule (60 mg) by mouth once daily. Do not crush or chew. 30 capsule 5    fluticasone (Flonase) 50 mcg/actuation nasal spray Administer 1-2 sprays into affected nostril(s) once daily.      levothyroxine (Synthroid, Levoxyl) 100 mcg tablet TAKE ONE TABLET BY MOUTH DAILY IN THE MORNING BEFORE A MEAL 90 tablet 0    loratadine (Claritin) 10 mg tablet Take 1 tablet (10 mg) by mouth once daily.      meloxicam (Mobic) 15 mg tablet TAKE ONE TABLET BY MOUTH ONCE DAILY 30 tablet 0     No current facility-administered medications for this visit.        ALLERGIES: has No Known Allergies.     SOCIAL HISTORY:  reports that she quit smoking about 4 years ago. Her smoking use included cigarettes. She has never used smokeless tobacco. She reports that she does not currently use alcohol. She reports that she does not use drugs.     GYNECOLOGICAL HISTORY (if applicable): {NA or comment:75513}    REVIEW OF SYSTEMS:   Review of Systems - Oncology       Objective    PHYSICAL EXAM:   Physical Exam    The Karnofsky performance scale today is {DESC; KARNOFSKY SCALE WITH ECOG EQUIVALENT:40966}.     DIAGNOSTIC REPORTS REVIEWED:   Imaging: ***   Laboratory/Pathology: ***   Procedure: ***

## 2024-12-09 ENCOUNTER — TELEPHONE (OUTPATIENT)
Dept: RADIATION ONCOLOGY | Facility: HOSPITAL | Age: 59
End: 2024-12-09
Payer: COMMERCIAL

## 2024-12-10 ENCOUNTER — HOSPITAL ENCOUNTER (OUTPATIENT)
Dept: RADIATION ONCOLOGY | Facility: HOSPITAL | Age: 59
Setting detail: RADIATION/ONCOLOGY SERIES
Discharge: HOME | End: 2024-12-10
Payer: COMMERCIAL

## 2024-12-10 VITALS
SYSTOLIC BLOOD PRESSURE: 124 MMHG | OXYGEN SATURATION: 99 % | DIASTOLIC BLOOD PRESSURE: 72 MMHG | WEIGHT: 160.94 LBS | BODY MASS INDEX: 26.78 KG/M2 | HEART RATE: 80 BPM | TEMPERATURE: 96.8 F | RESPIRATION RATE: 18 BRPM

## 2024-12-10 DIAGNOSIS — Z92.3 HX OF RADIATION THERAPY: ICD-10-CM

## 2024-12-10 DIAGNOSIS — C02.9 TONGUE CANCER (MULTI): ICD-10-CM

## 2024-12-10 PROCEDURE — 31575 DIAGNOSTIC LARYNGOSCOPY: CPT

## 2024-12-10 PROCEDURE — 99215 OFFICE O/P EST HI 40 MIN: CPT

## 2024-12-10 RX ORDER — LIDOCAINE HYDROCHLORIDE 20 MG/ML
JELLY TOPICAL
Status: DISPENSED
Start: 2024-12-10 | End: 2024-12-10

## 2024-12-10 ASSESSMENT — PAIN SCALES - GENERAL: PAINLEVEL_OUTOF10: 0-NO PAIN

## 2024-12-10 ASSESSMENT — ENCOUNTER SYMPTOMS
LOSS OF SENSATION IN FEET: 0
OCCASIONAL FEELINGS OF UNSTEADINESS: 0
DEPRESSION: 0

## 2024-12-10 ASSESSMENT — PATIENT HEALTH QUESTIONNAIRE - PHQ9
1. LITTLE INTEREST OR PLEASURE IN DOING THINGS: NOT AT ALL
2. FEELING DOWN, DEPRESSED OR HOPELESS: NOT AT ALL
SUM OF ALL RESPONSES TO PHQ9 QUESTIONS 1 AND 2: 0

## 2024-12-10 ASSESSMENT — COLUMBIA-SUICIDE SEVERITY RATING SCALE - C-SSRS
1. IN THE PAST MONTH, HAVE YOU WISHED YOU WERE DEAD OR WISHED YOU COULD GO TO SLEEP AND NOT WAKE UP?: NO
2. HAVE YOU ACTUALLY HAD ANY THOUGHTS OF KILLING YOURSELF?: NO
6. HAVE YOU EVER DONE ANYTHING, STARTED TO DO ANYTHING, OR PREPARED TO DO ANYTHING TO END YOUR LIFE?: NO

## 2025-02-25 ENCOUNTER — APPOINTMENT (OUTPATIENT)
Dept: OPHTHALMOLOGY | Age: 60
End: 2025-02-25
Payer: COMMERCIAL

## 2025-03-11 ENCOUNTER — OFFICE VISIT (OUTPATIENT)
Dept: PRIMARY CARE | Facility: CLINIC | Age: 60
End: 2025-03-11
Payer: COMMERCIAL

## 2025-03-11 VITALS
DIASTOLIC BLOOD PRESSURE: 60 MMHG | TEMPERATURE: 97.7 F | OXYGEN SATURATION: 97 % | WEIGHT: 159.5 LBS | BODY MASS INDEX: 26.57 KG/M2 | SYSTOLIC BLOOD PRESSURE: 110 MMHG | HEIGHT: 65 IN | HEART RATE: 65 BPM

## 2025-03-11 DIAGNOSIS — E78.9 LIPID DISORDER: ICD-10-CM

## 2025-03-11 DIAGNOSIS — Z12.39 BREAST SCREENING: Primary | ICD-10-CM

## 2025-03-11 DIAGNOSIS — E11.69 TYPE 2 DIABETES MELLITUS WITH OTHER SPECIFIED COMPLICATION, WITHOUT LONG-TERM CURRENT USE OF INSULIN: ICD-10-CM

## 2025-03-11 DIAGNOSIS — C06.9 MALIGNANT NEOPLASM OF MOUTH, UNSPECIFIED: ICD-10-CM

## 2025-03-11 DIAGNOSIS — F41.9 ANXIETY: ICD-10-CM

## 2025-03-11 DIAGNOSIS — E03.9 HYPOTHYROIDISM, UNSPECIFIED TYPE: ICD-10-CM

## 2025-03-11 RX ORDER — DULOXETIN HYDROCHLORIDE 60 MG/1
60 CAPSULE, DELAYED RELEASE ORAL DAILY
Qty: 30 CAPSULE | Refills: 5 | Status: SHIPPED | OUTPATIENT
Start: 2025-03-11 | End: 2025-09-07

## 2025-03-11 RX ORDER — ROSUVASTATIN CALCIUM 5 MG/1
5 TABLET, COATED ORAL DAILY
Qty: 100 TABLET | Refills: 3 | Status: SHIPPED | OUTPATIENT
Start: 2025-03-11 | End: 2026-04-15

## 2025-03-11 RX ORDER — LEVOTHYROXINE SODIUM 100 UG/1
100 TABLET ORAL DAILY
Qty: 90 TABLET | Refills: 0 | Status: SHIPPED | OUTPATIENT
Start: 2025-03-11 | End: 2026-03-11

## 2025-03-11 ASSESSMENT — PATIENT HEALTH QUESTIONNAIRE - PHQ9
SUM OF ALL RESPONSES TO PHQ9 QUESTIONS 1 AND 2: 2
1. LITTLE INTEREST OR PLEASURE IN DOING THINGS: SEVERAL DAYS
10. IF YOU CHECKED OFF ANY PROBLEMS, HOW DIFFICULT HAVE THESE PROBLEMS MADE IT FOR YOU TO DO YOUR WORK, TAKE CARE OF THINGS AT HOME, OR GET ALONG WITH OTHER PEOPLE: SOMEWHAT DIFFICULT
2. FEELING DOWN, DEPRESSED OR HOPELESS: SEVERAL DAYS

## 2025-03-11 NOTE — PROGRESS NOTES
This is a 59-year-old female patient who is here for follow-up    She is in remission for oral cancer    Under oncology care    She also known to have hypothyroidism is on levothyroxine    She has not not been compliant taking care of her health has not gone for her bone density and also the eye exam and she has not had her CT of the lung for early detection of lung cancer for heavy smoking (she has given up smoking)  I ordered the mammogram for this year    Since she agreed that she has not been eating healthy not taking care of herself I started on rosuvastatin since I see high LDL    Advised her to come back in June for her annual physical

## 2025-03-11 NOTE — PATIENT INSTRUCTIONS
That is what of life taking care of your parents  But that should not be an excuse to forget your health    I am glad you are not smoking anymore    I am glad that you are losing weight    But I am kind of disappointed that you have forgotten to get the CAT scan of your lung.,  And you have did not know that you had to get a ultrasound of your carotid artery those are the blood vessels going to your brain ordered by oncology practitioner it should be done no later than in June and scheduled for it    And also I placed a referral for you to get the bone density back in June  if you should say 9 months ago    Make sure that you will have an eye appointment          Looking at your cholesterol it was slightly on the high side since you are overwhelmed with taking care of your mother and father I do not think you are exercising and eating healthy for that reason I will start you on rosuvastatin 5 mg which is a very low-dose to lower your bad cholesterol and also start taking a 81 mg baby aspirin to keep your blood flow going    I renewed your levothyroxine 100 mcg take it on an empty stomach daily    I renewed your duloxetine 60 mg that will help with your pain as well as stress    See you back in  june for your annual physical

## 2025-06-04 ENCOUNTER — TELEPHONE (OUTPATIENT)
Dept: RADIATION ONCOLOGY | Facility: HOSPITAL | Age: 60
End: 2025-06-04
Payer: COMMERCIAL

## 2025-06-08 ASSESSMENT — ENCOUNTER SYMPTOMS
SHORTNESS OF BREATH: 0
ABDOMINAL DISTENTION: 0
SORE THROAT: 0
LEG SWELLING: 0
UNEXPECTED WEIGHT CHANGE: 0
CHILLS: 0
DIZZINESS: 0
COUGH: 0
DIARRHEA: 0
LIGHT-HEADEDNESS: 0
SPEECH DIFFICULTY: 0
WHEEZING: 0
ARTHRALGIAS: 1
EYE PROBLEMS: 1
HEMATURIA: 0
TROUBLE SWALLOWING: 0
VOMITING: 0
FATIGUE: 0
SEIZURES: 0
NAUSEA: 0
NERVOUS/ANXIOUS: 0
FEVER: 0
ABDOMINAL PAIN: 0
CONFUSION: 0
EXTREMITY WEAKNESS: 0
CONSTIPATION: 0
HEADACHES: 0
NUMBNESS: 0
BLOOD IN STOOL: 0
DIFFICULTY URINATING: 0
ADENOPATHY: 0
CHEST TIGHTNESS: 0
DEPRESSION: 0

## 2025-06-09 ENCOUNTER — HOSPITAL ENCOUNTER (OUTPATIENT)
Dept: RADIATION ONCOLOGY | Facility: HOSPITAL | Age: 60
Setting detail: RADIATION/ONCOLOGY SERIES
Discharge: HOME | End: 2025-06-09
Payer: COMMERCIAL

## 2025-06-09 VITALS
OXYGEN SATURATION: 99 % | RESPIRATION RATE: 18 BRPM | SYSTOLIC BLOOD PRESSURE: 117 MMHG | WEIGHT: 160 LBS | DIASTOLIC BLOOD PRESSURE: 64 MMHG | BODY MASS INDEX: 26.63 KG/M2 | TEMPERATURE: 96.6 F | HEART RATE: 83 BPM

## 2025-06-09 DIAGNOSIS — C02.9 TONGUE CANCER (MULTI): Primary | ICD-10-CM

## 2025-06-09 PROCEDURE — 99214 OFFICE O/P EST MOD 30 MIN: CPT

## 2025-06-09 ASSESSMENT — ENCOUNTER SYMPTOMS
LOSS OF SENSATION IN FEET: 0
DEPRESSION: 0
OCCASIONAL FEELINGS OF UNSTEADINESS: 0

## 2025-06-09 ASSESSMENT — COLUMBIA-SUICIDE SEVERITY RATING SCALE - C-SSRS
1. IN THE PAST MONTH, HAVE YOU WISHED YOU WERE DEAD OR WISHED YOU COULD GO TO SLEEP AND NOT WAKE UP?: NO
6. HAVE YOU EVER DONE ANYTHING, STARTED TO DO ANYTHING, OR PREPARED TO DO ANYTHING TO END YOUR LIFE?: NO
2. HAVE YOU ACTUALLY HAD ANY THOUGHTS OF KILLING YOURSELF?: NO

## 2025-06-09 ASSESSMENT — PATIENT HEALTH QUESTIONNAIRE - PHQ9
1. LITTLE INTEREST OR PLEASURE IN DOING THINGS: NOT AT ALL
SUM OF ALL RESPONSES TO PHQ9 QUESTIONS 1 AND 2: 0
2. FEELING DOWN, DEPRESSED OR HOPELESS: NOT AT ALL

## 2025-06-09 ASSESSMENT — PAIN SCALES - GENERAL: PAINLEVEL_OUTOF10: 0-NO PAIN
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
## 2025-06-10 ENCOUNTER — APPOINTMENT (OUTPATIENT)
Dept: RADIATION ONCOLOGY | Facility: HOSPITAL | Age: 60
End: 2025-06-10
Payer: COMMERCIAL

## 2025-06-15 DIAGNOSIS — E03.9 HYPOTHYROIDISM, UNSPECIFIED TYPE: ICD-10-CM

## 2025-06-16 RX ORDER — LEVOTHYROXINE SODIUM 100 UG/1
TABLET ORAL
Qty: 90 TABLET | Refills: 0 | Status: SHIPPED | OUTPATIENT
Start: 2025-06-16

## 2025-06-23 ENCOUNTER — APPOINTMENT (OUTPATIENT)
Dept: PRIMARY CARE | Facility: CLINIC | Age: 60
End: 2025-06-23
Payer: COMMERCIAL

## 2025-09-30 ENCOUNTER — APPOINTMENT (OUTPATIENT)
Dept: PRIMARY CARE | Facility: CLINIC | Age: 60
End: 2025-09-30
Payer: COMMERCIAL